# Patient Record
Sex: FEMALE | URBAN - METROPOLITAN AREA
[De-identification: names, ages, dates, MRNs, and addresses within clinical notes are randomized per-mention and may not be internally consistent; named-entity substitution may affect disease eponyms.]

---

## 2017-01-20 ENCOUNTER — OFFICE VISIT (OUTPATIENT)
Dept: FAMILY MEDICINE | Facility: CLINIC | Age: 47
End: 2017-01-20
Payer: COMMERCIAL

## 2017-01-20 VITALS
HEIGHT: 59 IN | WEIGHT: 118 LBS | DIASTOLIC BLOOD PRESSURE: 64 MMHG | OXYGEN SATURATION: 98 % | SYSTOLIC BLOOD PRESSURE: 110 MMHG | HEART RATE: 65 BPM | BODY MASS INDEX: 23.79 KG/M2 | TEMPERATURE: 99.2 F

## 2017-01-20 DIAGNOSIS — R51.9 NONINTRACTABLE EPISODIC HEADACHE, UNSPECIFIED HEADACHE TYPE: Primary | ICD-10-CM

## 2017-01-20 DIAGNOSIS — E87.6 HYPOKALEMIA: ICD-10-CM

## 2017-01-20 DIAGNOSIS — M25.512 ACUTE PAIN OF LEFT SHOULDER: ICD-10-CM

## 2017-01-20 PROCEDURE — 36415 COLL VENOUS BLD VENIPUNCTURE: CPT | Performed by: FAMILY MEDICINE

## 2017-01-20 PROCEDURE — 99214 OFFICE O/P EST MOD 30 MIN: CPT | Performed by: FAMILY MEDICINE

## 2017-01-20 PROCEDURE — 83735 ASSAY OF MAGNESIUM: CPT | Performed by: FAMILY MEDICINE

## 2017-01-20 PROCEDURE — 80048 BASIC METABOLIC PNL TOTAL CA: CPT | Performed by: FAMILY MEDICINE

## 2017-01-20 NOTE — NURSING NOTE
"Chief Complaint   Patient presents with     Headache     Shoulder Pain     left       Initial /64 mmHg  Pulse 65  Temp(Src) 99.2  F (37.3  C) (Tympanic)  Ht 4' 10.5\" (1.486 m)  Wt 118 lb (53.524 kg)  BMI 24.24 kg/m2  SpO2 98% Estimated body mass index is 24.24 kg/(m^2) as calculated from the following:    Height as of this encounter: 4' 10.5\" (1.486 m).    Weight as of this encounter: 118 lb (53.524 kg)..  BP completed using cuff size: regular Sarah Severson, CMA  "

## 2017-01-20 NOTE — Clinical Note
[address] [address]                            [phone]  Fax: [phone]  January 23, 2017     Michelle Toussaint  [address] [place] [address]      Dear Michelle,    I have reviewed your recent labs. Here are the results:      -All of your labs are normal.      Results for orders placed or performed in visit on 01/20/17   Basic metabolic panel   Result Value Ref Range    Sodium 143 133 - 144 mmol/L    Potassium 3.9 3.4 - 5.3 mmol/L    Chloride 107 94 - 109 mmol/L    Carbon Dioxide 26 20 - 32 mmol/L    Anion Gap 10 3 - 14 mmol/L    Glucose 90 70 - 99 mg/dL    Urea Nitrogen 11 7 - 30 mg/dL    Creatinine 0.63 0.52 - 1.04 mg/dL    GFR Estimate >90  Non  GFR Calc   >60 mL/min/1.7m2    GFR Estimate If Black >90   GFR Calc   >60 mL/min/1.7m2    Calcium 8.6 8.5 - 10.1 mg/dL   Magnesium   Result Value Ref Range    Magnesium 2.2 1.6 - 2.3 mg/dL               Thank you for choosing [facility].  We appreciate the opportunity to serve you and look forward to supporting your healthcare needs in the future.    If you have any questions or concerns, please call me or my staff at [phone].      Sincerely,      Sunny Valles M.D.

## 2017-01-20 NOTE — PROGRESS NOTES
SUBJECTIVE:                                                    Michelle Toussaint is a 46 year old female who presents to clinic today for the following health issues:      Headache     Onset: Monday    Description:   Location: bilateral in the frontal area, bilateral in the temporal area, bilateral in the occipital area   Character: throbbing pain, squeezing pain  Frequency:  varies  Duration:  varies    Intensity: moderate, severe    Progression of Symptoms:  worsening    Accompanying Signs & Symptoms:  Stiff neck: no   Neck or upper back pain: no   Fever: YES  Sinus pressure: YES  Nausea or vomiting: YES  Dizziness: YES  Numbness: no   Weakness: no   Visual changes: YES   History:   Head trauma: no   Family history of migraines: no   Previous tests for headaches: no   Neurologist evaluations: no   Able to do daily activities: no   Wake with a headaches: YES  Do headaches wake you up: YES  Daily pain medication use: YES  Work/school stressors/changes: YES    Precipitating factors:   Does light make it worse: YES  Does sound make it worse: YES    Alleviating factors:  Does sleep help: YES         Therapies Tried and outcome: Ibuprofen (Advil, Motrin), Tylenol and narcotics ( codeine ) zofran, warm baths    Patient had a head CT in 2015 which was negative. She has h/o migraines. No URI symptoms reported.     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     H/o hypokalemia. Takes potassium regularly.   HYPERTENSION - well controlled with the  medication.       Problem list and histories reviewed & adjusted, as indicated.  Additional history: as documented    Patient Active Problem List   Diagnosis     Hypertensive urgency     Trichotillomania     HTN, goal below 140/90     Insomnia     Perimenopausal symptoms     Vitamin D deficiency     Fatigue     Abdominal cramps     Past Surgical History   Procedure Laterality Date     Gallbladder surgery       2007?     Cholecystectomy       Esophagoscopy, gastroscopy, duodenoscopy (egd), combined N/A 9/4/2015     Procedure: COMBINED ESOPHAGOSCOPY, GASTROSCOPY, DUODENOSCOPY (EGD), BIOPSY SINGLE OR MULTIPLE;  Surgeon: Hao Grove MD;  Location:  GI     Colonoscopy N/A 10/13/2015     Procedure: COMBINED COLONOSCOPY, SINGLE OR MULTIPLE BIOPSY/POLYPECTOMY BY BIOPSY;  Surgeon: Michael Mccoy MD;  Location:  GI       Social History   Substance Use Topics     Smoking status: Never Smoker      Smokeless tobacco: Never Used     Alcohol Use: 0.0 oz/week     0 Standard drinks or equivalent per week      Comment: occasional      Family History   Problem Relation Age of Onset     DIABETES Mother      Hypertension Sister      CEREBROVASCULAR DISEASE Sister      TIA 35 years, heart defect.      Heart Failure Father      Cirrhosis Father          Current Outpatient Prescriptions   Medication Sig Dispense Refill     polyethylene glycol (MIRALAX/GLYCOLAX) powder MIX 17 GRAMS IN LIQUID AND DRINK BY MOUTH EVERY  g 1     potassium chloride SA (POTASSIUM CHLORIDE) 20 MEQ CR tablet Take 1 tablet (20 mEq) by mouth 2 times daily 60 tablet 3     ondansetron (ZOFRAN) 4 MG tablet Take 1 tablet (4 mg) by mouth every 8 hours as needed for nausea 18 tablet 1     GLUCOSAMINE SULFATE PO Take 500 mg by mouth       lisinopril-hydrochlorothiazide (PRINZIDE,ZESTORETIC) 20-25 MG per tablet Take 1 tablet by mouth daily 90 tablet 3     traZODone (DESYREL) 50 MG tablet TAKE 1 TABLET BY MOUTH EVERY NIGHT AT BEDTIME 30 tablet 11      "omeprazole (PRILOSEC) 40 MG capsule Take 1 capsule (40 mg) by mouth daily 90 capsule 3     meclizine (ANTIVERT) 25 MG tablet Take 1 tablet (25 mg) by mouth every 6 hours as needed for dizziness 30 tablet 1     cyclobenzaprine (FLEXERIL) 10 MG tablet Take 0.5-1 tablets (5-10 mg) by mouth 3 times daily as needed for muscle spasms 30 tablet 1     Allergies   Allergen Reactions     Tylenol [Acetaminophen] Nausea and Itching       ROS:  C: NEGATIVE for fever, chills, change in weight  INTEGUMENTARY/SKIN: NEGATIVE for worrisome rashes, moles or lesions  E/M: NEGATIVE for ear, mouth and throat problems  R: NEGATIVE for significant cough or SOB  CV: NEGATIVE for chest pain, palpitations or peripheral edema    OBJECTIVE:                                                    /64 mmHg  Pulse 65  Temp(Src) 99.2  F (37.3  C) (Tympanic)  Ht 4' 10.5\" (1.486 m)  Wt 118 lb (53.524 kg)  BMI 24.24 kg/m2  SpO2 98%  Body mass index is 24.24 kg/(m^2).   GENERAL: healthy, alert, well nourished, well hydrated, no distress  EYES: Eyes grossly normal to inspection, extraocular movements - intact, and PERRL  HENT: ear canals- normal; TMs- normal; Nose- normal; Mouth- no ulcers, no lesions  NECK: no tenderness, no adenopathy, no asymmetry, no masses, no stiffness; thyroid- normal to palpation  RESP: lungs clear to auscultation - no rales, no rhonchi, no wheezes  CV: regular rates and rhythm, normal S1 S2, no S3 or S4 and no murmur, no click or rub -  NEURO: strength and tone- normal, sensory exam- grossly normal, mentation- intact, speech- normal, reflexes- symmetric  Left shoulder: ROM is limited due to pain. Tender over the lateral part of deltoid. No swelling, ecchymosis noted.           ASSESSMENT/PLAN:                                                      1. Nonintractable episodic headache, unspecified headache type  Questionable etiology. Patient requested a stronger pain medication.   Recommended against narcotics.  Recommended " to stay well hydrated.   May try OTC Excedrin  Holding off on CT head as she had one in 2015 which was normal.   NO sings of URI.  Recommended to see neurology . Patient has h/o migraine.    - NEUROLOGY ADULT REFERRAL    2. Acute pain of left shoulder  Likely muscular sprain. Recommended to avoid lifting >10 lbs at work for the next 2 weeks.use ibuprofen and ice the area. If no improvement in 2 weeks noted, she should get MRI of the shoulder.     3. Hypokalemia    Await the BMP results.   - Basic metabolic panel  - Magnesium      Follow up with Provider - as needed     Reena Correa MD  [facility]

## 2017-01-20 NOTE — MR AVS SNAPSHOT
After Visit Summary   1/20/2017    Michelle Toussaint    MRN: [id number]           Patient Information     Date Of Birth          1970        Visit Information        Provider Department      1/20/2017 1:20 PM Reena Correa MD [facility] [place]        Today's Diagnoses     Hypokalemia    -  1     Nonintractable episodic headache, unspecified headache type         Acute pain of left shoulder            Follow-ups after your visit        Additional Services     NEUROLOGY ADULT REFERRAL       Your provider has referred you to: N: [facility]  [place] [phone]   [email or web address]    Reason for Referral: Consult    Please be aware that coverage of these services is subject to the terms and limitations of your health insurance plan.  Call member services at your health plan with any benefit or coverage questions.      Please bring the following with you to your appointment:    (1) Any X-Rays, CTs or MRIs which have been performed.  Contact the facility where they were done to arrange for  prior to your scheduled appointment.    (2) List of current medications  (3) This referral request   (4) Any documents/labs given to you for this referral                  Who to contact     If you have questions or need follow up information about today's clinic visit or your schedule please contact [facility] [place] directly at [phone].  Normal or non-critical lab and imaging results will be communicated to you by MyChart, letter or phone within 4 business days after the clinic has received the results. If you do not hear from us within 7 days, please contact the clinic through MyChart or phone. If you have a critical or abnormal lab result, we will notify you by phone as soon as possible.  Submit refill requests through [employer] or call your pharmacy and they will forward the refill request to us. Please allow 3 business  "days for your refill to be completed.          Additional Information About Your Visit        [employer] Information     [employer] lets you send messages to your doctor, view your test results, renew your prescriptions, schedule appointments and more. To sign up, go to [email or web address] . Click on \"Log in\" on the left side of the screen, which will take you to the Welcome page. Then click on \"Sign up Now\" on the right side of the page.     You will be asked to enter the access code listed below, as well as some personal information. Please follow the directions to create your username and password.     Your access code is: [email or web address]  Expires: 2017  1:59 PM     Your access code will  in 90 days. If you need help or a new code, please call your [facility] clinic or [phone].        Care EveryWhere ID     This is your Care EveryWhere ID. This could be used by other organizations to access your [facility] medical records  [id number]        Your Vitals Were     Pulse Temperature Height BMI (Body Mass Index) Pulse Oximetry       65 99.2  F (37.3  C) (Tympanic) 4' 10.5\" (1.486 m) 24.24 kg/m2 98%        Blood Pressure from Last 3 Encounters:   17 110/64   16 120/80   16 132/84    Weight from Last 3 Encounters:   17 118 lb (53.524 kg)   16 115 lb (52.164 kg)   16 120 lb (54.432 kg)              We Performed the Following     Basic metabolic panel     NEUROLOGY ADULT REFERRAL          Today's Medication Changes          These changes are accurate as of: 17  2:00 PM.  If you have any questions, ask your nurse or doctor.               Stop taking these medicines if you haven't already. Please contact your care team if you have questions.     albuterol 108 (90 BASE) MCG/ACT Inhaler   Commonly known as:  PROAIR HFA/PROVENTIL HFA/VENTOLIN HFA   Stopped by:  Reena Correa MD           order for DME   Stopped by:  Reena Correa MD                    Primary Care Provider " Office Phone # Fax #    Reena Correa [phone]       [facility] [place] [address] [place] [address] [address]  [place] [address]        Thank you!     [facility]  [facility]. Our goal is always to provide you with excellent care. Hearing back from our patients is one way we can continue to improve our services. Please take a few minutes to complete the written survey that you may receive in the mail after your visit with us. Thank you!             Your Updated Medication List - Protect others around you: Learn how to safely use, store and throw away your medicines at www.disposemymeds.org.          This list is accurate as of: 1/20/17  2:00 PM.  Always use your most recent med list.                   Brand Name Dispense Instructions for use    cyclobenzaprine 10 MG tablet    FLEXERIL    30 tablet    Take 0.5-1 tablets (5-10 mg) by mouth 3 times daily as needed for muscle spasms       GLUCOSAMINE SULFATE PO      Take 500 mg by mouth       lisinopril-hydrochlorothiazide 20-25 MG per tablet    PRINZIDE/ZESTORETIC    90 tablet    Take 1 tablet by mouth daily       meclizine 25 MG tablet    ANTIVERT    30 tablet    Take 1 tablet (25 mg) by mouth every 6 hours as needed for dizziness       omeprazole 40 MG capsule    priLOSEC    90 capsule    Take 1 capsule (40 mg) by mouth daily       ondansetron 4 MG tablet    ZOFRAN    18 tablet    Take 1 tablet (4 mg) by mouth every 8 hours as needed for nausea       polyethylene glycol powder    MIRALAX/GLYCOLAX    527 g    MIX 17 GRAMS IN LIQUID AND DRINK BY MOUTH EVERY DAY       potassium chloride SA 20 MEQ CR tablet    potassium chloride    60 tablet    Take 1 tablet (20 mEq) by mouth 2 times daily       traZODone 50 MG tablet    DESYREL    30 tablet    TAKE 1 TABLET BY MOUTH EVERY NIGHT AT BEDTIME

## 2017-01-20 NOTE — Clinical Note
[facility]          [address] [place] [address]                            [phone]  Fax: [phone]    Michelle Toussaint  [facility] [address] [place] [address]    [phone]    January 20, 2017      To whom it may concern     Michelle Toussaint was seen today at the clinic. I have recommended to avoid lifting, pulling pushing weight more than 10 lbs for the next 2 weeks. After that she can resume normal work.      If you have any other questions or concerns please feel free to contact me at anytime.        Sincerely,      Sunny Valles M.D.

## 2017-01-21 LAB
ANION GAP SERPL CALCULATED.3IONS-SCNC: 10 MMOL/L (ref 3–14)
BUN SERPL-MCNC: 11 MG/DL (ref 7–30)
CALCIUM SERPL-MCNC: 8.6 MG/DL (ref 8.5–10.1)
CHLORIDE SERPL-SCNC: 107 MMOL/L (ref 94–109)
CO2 SERPL-SCNC: 26 MMOL/L (ref 20–32)
CREAT SERPL-MCNC: 0.63 MG/DL (ref 0.52–1.04)
GFR SERPL CREATININE-BSD FRML MDRD: NORMAL ML/MIN/1.7M2
GLUCOSE SERPL-MCNC: 90 MG/DL (ref 70–99)
MAGNESIUM SERPL-MCNC: 2.2 MG/DL (ref 1.6–2.3)
POTASSIUM SERPL-SCNC: 3.9 MMOL/L (ref 3.4–5.3)
SODIUM SERPL-SCNC: 143 MMOL/L (ref 133–144)

## 2017-01-26 ENCOUNTER — TELEPHONE (OUTPATIENT)
Dept: FAMILY MEDICINE | Facility: CLINIC | Age: 47
End: 2017-01-26

## 2017-01-26 DIAGNOSIS — H93.19 RINGING IN EAR, UNSPECIFIED LATERALITY: Primary | ICD-10-CM

## 2017-01-26 NOTE — TELEPHONE ENCOUNTER
1- She should continue potassium supplements as before.   2- She had her hemoglobin check 1 month ago and it was normal, therefore it was not repeated.  3- For the ear symptoms, I would like her to see ENT. Referral ordered.   4- No need for OV. I will fill the form.    Reena Correa MD  [facility] [place]

## 2017-01-26 NOTE — TELEPHONE ENCOUNTER
Patient calling for test results - went over the normal values.  1. Patient wants to specificly know if she should continue with the potassium suppliment.    2. Also states that she continuously feels worn out and tired and was wondering if the labs would show this?  Questioned a cbc- this was not drawn    3. States that she has been having a ringing in her ears- but only when she goes upstairs-sometimes it happens on the main floor. But get's louder when she goes upstairs- like a base drum sound  Has meclizine and wants to know if this is something she can take for the ears?    4. Is on work restrictions for her left arm/shoulder and needs a form filled out to lift the restrictions. Shoulder is still a little uncomfortable, but return to work date is 02/03/17 and she thinks she will be fine by then.  Does not have the form yet, but wants to know if she needs an appointment for this?    please advise,    Regina Mccord RN  [facility]  [phone]

## 2017-01-26 NOTE — TELEPHONE ENCOUNTER
Left detailed message (we have consent) informing of below and providing referral information.   Agueda Quispe RN   [facility] - Triage

## 2017-02-02 ENCOUNTER — TELEPHONE (OUTPATIENT)
Dept: FAMILY MEDICINE | Facility: CLINIC | Age: 47
End: 2017-02-02

## 2017-02-02 NOTE — TELEPHONE ENCOUNTER
Forms signed by Reena Correa MD   and they were faxed to [facility] [phone] on February 2, 2017  Original Mailed to patient  Copy Sent to abstracting for scanning.  Gregoria Gonzalez TC

## 2017-06-27 DIAGNOSIS — K21.9 GASTROESOPHAGEAL REFLUX DISEASE WITHOUT ESOPHAGITIS: ICD-10-CM

## 2017-06-27 DIAGNOSIS — H81.10 BPPV (BENIGN PAROXYSMAL POSITIONAL VERTIGO), UNSPECIFIED LATERALITY: ICD-10-CM

## 2017-06-28 DIAGNOSIS — E87.6 HYPOKALEMIA: ICD-10-CM

## 2017-06-28 RX ORDER — POTASSIUM CHLORIDE 1500 MG/1
20 TABLET, EXTENDED RELEASE ORAL 2 TIMES DAILY
Qty: 60 TABLET | Refills: 1 | Status: SHIPPED | OUTPATIENT
Start: 2017-06-28 | End: 2017-07-19

## 2017-06-28 NOTE — TELEPHONE ENCOUNTER
Last OV note on 12/19/16 as below    Hypokalemia  Starting the patient on potassium supplement.   - potassium chloride SA (POTASSIUM CHLORIDE) 20 MEQ CR tablet; Take 1 tablet (20 mEq) by mouth 2 times daily  Dispense: 60 tablet; Refill: 3    Please review if patient still need to be on Potassium supplement and refill as appropriate.  Thank you    Cierra Lam RN

## 2017-06-28 NOTE — LETTER
[address] [place] [address]   [phone]      July 6, 2017    Michelle Toussaint  [address] [place] [address]            Dear Ms. Terry Toussaint    Your physician requires lab work in order to monitor your maintenance medication(s).  Please call the clinic at [phone] to schedule a lab only appointment. You do not need to be fasting.        Sincerely,    [facility]

## 2017-06-28 NOTE — TELEPHONE ENCOUNTER
Patient needs BMP checked. Medication ordered. BMP ordered.    Reena Correa MD  [facility], [place]

## 2017-06-28 NOTE — TELEPHONE ENCOUNTER
Omeprazole      Last Written Prescription Date: 6/24/16  Last Fill Quantity: 90,  # refills: 3   Last Office Visit with [facility], [facility] or [facility] prescribing provider: 1/20/17    Meclizine      Last Written Prescription Date: 6/24/16  Last Fill Quantity: 30,  # refills: 1   Last Office Visit with [facility], [facility] or [facility] prescribing provider: 1/20/17

## 2017-06-28 NOTE — TELEPHONE ENCOUNTER
potassium chloride SA (POTASSIUM CHLORIDE) 20 MEQ      Last Written Prescription Date: 12/20/16  Last Fill Quantity: 60, # refills: 3  Last Office Visit with [facility], P or [facility] prescribing provider: 1/20/17       Potassium   Date Value Ref Range Status   01/20/2017 3.9 3.4 - 5.3 mmol/L Final     Creatinine   Date Value Ref Range Status   01/20/2017 0.63 0.52 - 1.04 mg/dL Final     BP Readings from Last 3 Encounters:   01/20/17 110/64   12/19/16 120/80   09/07/16 132/84

## 2017-06-28 NOTE — TELEPHONE ENCOUNTER
Routing to team to inform and assist in scheduling.   Agueda Quispe RN   [facility] - Triage

## 2017-06-30 RX ORDER — MECLIZINE HYDROCHLORIDE 25 MG/1
TABLET ORAL
Qty: 30 TABLET | Refills: 0 | Status: SHIPPED | OUTPATIENT
Start: 2017-06-30 | End: 2017-07-26

## 2017-06-30 RX ORDER — OMEPRAZOLE 40 MG/1
CAPSULE, DELAYED RELEASE ORAL
Qty: 90 CAPSULE | Refills: 0 | Status: SHIPPED | OUTPATIENT
Start: 2017-06-30 | End: 2017-09-01

## 2017-06-30 NOTE — TELEPHONE ENCOUNTER
left voicemail message for patient to contact Main Clinic Number to schedule.  NTBS: Patient needs BMP checked- Just needs a non fasting lab appt  Gregoria ZAPATA

## 2017-06-30 NOTE — TELEPHONE ENCOUNTER
Prescription approved per [facility] Refill Protocol.  Jony Pulido, RN, BSN

## 2017-07-13 DIAGNOSIS — E87.6 HYPOKALEMIA: ICD-10-CM

## 2017-07-13 PROCEDURE — 36415 COLL VENOUS BLD VENIPUNCTURE: CPT | Performed by: FAMILY MEDICINE

## 2017-07-13 PROCEDURE — 80048 BASIC METABOLIC PNL TOTAL CA: CPT | Performed by: FAMILY MEDICINE

## 2017-07-14 LAB
ANION GAP SERPL CALCULATED.3IONS-SCNC: 11 MMOL/L (ref 3–14)
BUN SERPL-MCNC: 18 MG/DL (ref 7–30)
CALCIUM SERPL-MCNC: 9.3 MG/DL (ref 8.5–10.1)
CHLORIDE SERPL-SCNC: 104 MMOL/L (ref 94–109)
CO2 SERPL-SCNC: 24 MMOL/L (ref 20–32)
CREAT SERPL-MCNC: 0.6 MG/DL (ref 0.52–1.04)
GFR SERPL CREATININE-BSD FRML MDRD: NORMAL ML/MIN/1.7M2
GLUCOSE SERPL-MCNC: 89 MG/DL (ref 70–99)
POTASSIUM SERPL-SCNC: 3.9 MMOL/L (ref 3.4–5.3)
SODIUM SERPL-SCNC: 139 MMOL/L (ref 133–144)

## 2017-07-19 ENCOUNTER — TELEPHONE (OUTPATIENT)
Dept: FAMILY MEDICINE | Facility: CLINIC | Age: 47
End: 2017-07-19

## 2017-07-19 DIAGNOSIS — G47.00 INSOMNIA, UNSPECIFIED TYPE: Primary | ICD-10-CM

## 2017-07-19 DIAGNOSIS — E87.6 HYPOKALEMIA: ICD-10-CM

## 2017-07-19 RX ORDER — POTASSIUM CHLORIDE 1500 MG/1
20 TABLET, EXTENDED RELEASE ORAL DAILY
Start: 2017-07-19 | End: 2017-09-05

## 2017-07-19 NOTE — TELEPHONE ENCOUNTER
Reason for Call:  Other call back    Detailed comments: PT STATED POTASSIUM LEVELS GOOD LAST LAB DRAW, WANTS TO KNOW IF SHE SHOULD STOP POTASSIUM PILLS DUE TO NORMAL LABS    Phone Number Patient can be reached at: Home number on file [phone] (home)    Best Time: NA    Can we leave a detailed message on this number? YES    Call taken on 7/19/2017 at 1:47 PM by Barry Martin

## 2017-07-19 NOTE — TELEPHONE ENCOUNTER
Patient notified with information noted below from provider and agrees with plan.  Kiki Rae RN - Triage  [facility]

## 2017-07-19 NOTE — TELEPHONE ENCOUNTER
I would suggest trying to lowering it to 20 mEq QD, instead of BID.   Recheck in 2-3 months again, and then we can decide, if we can stop or not.     Reena Correa MD  [facility] [place]

## 2017-07-19 NOTE — TELEPHONE ENCOUNTER
So she has been taking 20 mEq daily. I would suggest using half tab only, 10 mEq daily. Recheck in 2-3 months.    Reena Correa MD  [facility], [place]

## 2017-07-19 NOTE — TELEPHONE ENCOUNTER
Patient reports that for the last month she has been taking only 1 tablet due to them being so big.  Will continue to take one tablet and follow up.  Kiki Rae RN - Triage  [facility]

## 2017-07-20 RX ORDER — TRAZODONE HYDROCHLORIDE 50 MG/1
TABLET, FILM COATED ORAL
Qty: 30 TABLET | Refills: 3 | Status: SHIPPED | OUTPATIENT
Start: 2017-07-20

## 2017-07-26 ENCOUNTER — OFFICE VISIT (OUTPATIENT)
Dept: FAMILY MEDICINE | Facility: CLINIC | Age: 47
End: 2017-07-26
Payer: COMMERCIAL

## 2017-07-26 VITALS
HEIGHT: 59 IN | SYSTOLIC BLOOD PRESSURE: 136 MMHG | WEIGHT: 122 LBS | TEMPERATURE: 99 F | DIASTOLIC BLOOD PRESSURE: 68 MMHG | OXYGEN SATURATION: 100 % | BODY MASS INDEX: 24.6 KG/M2 | HEART RATE: 66 BPM

## 2017-07-26 DIAGNOSIS — G47.00 INSOMNIA, UNSPECIFIED TYPE: ICD-10-CM

## 2017-07-26 DIAGNOSIS — E55.9 VITAMIN D DEFICIENCY: ICD-10-CM

## 2017-07-26 DIAGNOSIS — M54.2 NECK PAIN: ICD-10-CM

## 2017-07-26 DIAGNOSIS — R42 DIZZINESS: Primary | ICD-10-CM

## 2017-07-26 DIAGNOSIS — F41.9 ANXIETY AND DEPRESSION: ICD-10-CM

## 2017-07-26 DIAGNOSIS — F32.A ANXIETY AND DEPRESSION: ICD-10-CM

## 2017-07-26 DIAGNOSIS — M26.609 TMJ (TEMPOROMANDIBULAR JOINT SYNDROME): ICD-10-CM

## 2017-07-26 DIAGNOSIS — I10 ESSENTIAL HYPERTENSION WITH GOAL BLOOD PRESSURE LESS THAN 140/90: ICD-10-CM

## 2017-07-26 DIAGNOSIS — H81.10 BPPV (BENIGN PAROXYSMAL POSITIONAL VERTIGO), UNSPECIFIED LATERALITY: ICD-10-CM

## 2017-07-26 PROCEDURE — 82306 VITAMIN D 25 HYDROXY: CPT | Performed by: FAMILY MEDICINE

## 2017-07-26 PROCEDURE — 36415 COLL VENOUS BLD VENIPUNCTURE: CPT | Performed by: FAMILY MEDICINE

## 2017-07-26 PROCEDURE — 80053 COMPREHEN METABOLIC PANEL: CPT | Performed by: FAMILY MEDICINE

## 2017-07-26 PROCEDURE — 99215 OFFICE O/P EST HI 40 MIN: CPT | Performed by: FAMILY MEDICINE

## 2017-07-26 RX ORDER — SERTRALINE HYDROCHLORIDE 25 MG/1
25 TABLET, FILM COATED ORAL DAILY
Qty: 30 TABLET | Refills: 1 | Status: SHIPPED | OUTPATIENT
Start: 2017-07-26 | End: 2017-11-30 | Stop reason: DRUGHIGH

## 2017-07-26 RX ORDER — ALPRAZOLAM 0.25 MG
0.25 TABLET ORAL 3 TIMES DAILY PRN
Qty: 14 TABLET | Refills: 0 | Status: SHIPPED | OUTPATIENT
Start: 2017-07-26 | End: 2017-08-23

## 2017-07-26 RX ORDER — MECLIZINE HYDROCHLORIDE 25 MG/1
25 TABLET ORAL 3 TIMES DAILY PRN
Qty: 30 TABLET | Refills: 0 | Status: SHIPPED | OUTPATIENT
Start: 2017-07-26

## 2017-07-26 RX ORDER — BIOTIN 5 MG
TABLET ORAL
COMMUNITY

## 2017-07-26 ASSESSMENT — ANXIETY QUESTIONNAIRES
7. FEELING AFRAID AS IF SOMETHING AWFUL MIGHT HAPPEN: MORE THAN HALF THE DAYS
GAD7 TOTAL SCORE: 15
5. BEING SO RESTLESS THAT IT IS HARD TO SIT STILL: MORE THAN HALF THE DAYS
3. WORRYING TOO MUCH ABOUT DIFFERENT THINGS: NEARLY EVERY DAY
IF YOU CHECKED OFF ANY PROBLEMS ON THIS QUESTIONNAIRE, HOW DIFFICULT HAVE THESE PROBLEMS MADE IT FOR YOU TO DO YOUR WORK, TAKE CARE OF THINGS AT HOME, OR GET ALONG WITH OTHER PEOPLE: SOMEWHAT DIFFICULT
2. NOT BEING ABLE TO STOP OR CONTROL WORRYING: MORE THAN HALF THE DAYS
1. FEELING NERVOUS, ANXIOUS, OR ON EDGE: SEVERAL DAYS
6. BECOMING EASILY ANNOYED OR IRRITABLE: MORE THAN HALF THE DAYS

## 2017-07-26 ASSESSMENT — PATIENT HEALTH QUESTIONNAIRE - PHQ9: 5. POOR APPETITE OR OVEREATING: NEARLY EVERY DAY

## 2017-07-26 NOTE — NURSING NOTE
"Chief Complaint   Patient presents with     Hypertension     elevated bp        Initial /82  Pulse 66  Temp 99  F (37.2  C) (Tympanic)  Ht 4' 10.5\" (1.486 m)  Wt 122 lb (55.3 kg)  LMP 07/22/2017  SpO2 100%  BMI 25.06 kg/m2 Estimated body mass index is 25.06 kg/(m^2) as calculated from the following:    Height as of this encounter: 4' 10.5\" (1.486 m).    Weight as of this encounter: 122 lb (55.3 kg).  Medication Reconciliation: complete  "

## 2017-07-26 NOTE — PROGRESS NOTES
SUBJECTIVE:                                                    Michelle Toussaint is a 47 year old female who presents to clinic today for the following health issues:      Hypertension Follow-up      Outpatient blood pressures are being checked at work.  Results are running higher lately 130-140/ 80-95      Very elevated 172/102 at work today and she was not feeling too well     Low Salt Diet: low salt     Has been experiencing headaches , dizziness, nausea and fatigue. decided to check BP and it was elevated at work also elevated  On and off for the past couple of days.      Has hx of vertigo and has bene given meclizine for vertigo , so sometimes when has vertigo her BP can get higher too .    Has been feeling achy and week feeling , more tired recently. Not sleeping too well lately. not taking her trazodone bc it makes her tired next day.  melatonin does not wok has been  more stressed these day     Her neck always bothers her and get achy stiff, and having more HA lately sometimes her arm feel achy and sore no numbness or focal weakness. Feeling stressed at work and she wakes wit HA lately .   Denies any chest pain sob, exertional sob palpitation . sometimes she notice chest tightness at night when she is trying to relax at night and could be anxiety     Amount of exercise or physical activity: None    Problems taking medications regularly: No    Medication side effects: none  Diet: regular (no restrictions)      PROBLEMS TO ADD ON...  Had low vitamin d . Wish to get recheck       Hypertension Follow-up      Outpatient blood pressures are being checked at home and work.  Results are as above .    Low Salt Diet: no added salt    Depression and Anxiety Follow-Up    Status since last visit: Worsened     Other associated symptoms:See phq-9 and darinel 7    Complicating factors:     Significant life event: No     Current substance abuse: None    PHQ-9 SCORE 7/26/2017   Total Score 16     DARINEL-7 SCORE 7/26/2017    Total Score 15       PHQ-9  English  PHQ-9   Any Language  GAD7          Problem list and histories reviewed & adjusted, as indicated.  Additional history: as documented    Patient Active Problem List   Diagnosis     Hypertensive urgency     Trichotillomania     HTN, goal below 140/90     Insomnia     Perimenopausal symptoms     Vitamin D deficiency     Fatigue     Abdominal cramps     Past Surgical History:   Procedure Laterality Date     CHOLECYSTECTOMY       COLONOSCOPY N/A 10/13/2015    Procedure: COMBINED COLONOSCOPY, SINGLE OR MULTIPLE BIOPSY/POLYPECTOMY BY BIOPSY;  Surgeon: Michael Mccoy MD;  Location: [facility]     ESOPHAGOSCOPY, GASTROSCOPY, DUODENOSCOPY (EGD), COMBINED N/A 9/4/2015    Procedure: COMBINED ESOPHAGOSCOPY, GASTROSCOPY, DUODENOSCOPY (EGD), BIOPSY SINGLE OR MULTIPLE;  Surgeon: Hao Grove MD;  Location: [facility]     GALLBLADDER SURGERY      2007?       Social History   Substance Use Topics     Smoking status: Never Smoker     Smokeless tobacco: Never Used     Alcohol use 0.0 oz/week     0 Standard drinks or equivalent per week      Comment: occasional      Family History   Problem Relation Age of Onset     DIABETES Mother      Hypertension Sister      CEREBROVASCULAR DISEASE Sister      TIA 35 years, heart defect.      Heart Failure Father      Cirrhosis Father              Reviewed and updated as needed this visit by clinical staff     Reviewed and updated as needed this visit by Provider         ROS:  C: NEGATIVE for fever, chills, change in weight  I: NEGATIVE for worrisome rashes, moles or lesions  E: NEGATIVE for vision changes or irritation  E/M: NEGATIVE for ear, mouth and throat problems  R: NEGATIVE for significant cough or SOB  CV: NEGATIVE for chest pain, palpitations or peripheral edema  GI: NEGATIVE for nausea, abdominal pain, heartburn, or change in bowel habits  : NEGATIVE for frequency, dysuria, or hematuria  MUSCULOSKELETAL:NEGATIVE for significant arthralgias or  "myalgia, POSITIVE  for ongoing pain neck pain  NEURO: as per HPI   E: NEGATIVE for temperature intolerance, skin/hair changes  H: NEGATIVE for bleeding problems  PSYCHIATRIC: as per HPI      OBJECTIVE:     /82  Pulse 66  Temp 99  F (37.2  C) (Tympanic)  Ht 4' 10.5\" (1.486 m)  Wt 122 lb (55.3 kg)  LMP 07/22/2017  SpO2 100%  BMI 25.06 kg/m2  Body mass index is 25.06 kg/(m^2).  GENERAL: healthy, alert and no distress  EYES: Eyes grossly normal to inspection, PERRL and conjunctivae and sclerae normal  HENT: ear canals and TM's normal, nose and mouth without ulcers or lesions  NECK: no adenopathy, no asymmetry, masses, or scars and thyroid normal to palpation  RESP: lungs clear to auscultation - no rales, rhonchi or wheezes  CV: regular rate and rhythm, normal S1 S2, no S3 or S4, no murmur, click or rub, no peripheral edema and peripheral pulses strong  ABDOMEN: soft, nontender, no hepatosplenomegaly, no masses and bowel sounds normal  MS: decreased range of motion  and tenderness to palpation   NEURO: Normal strength and tone, sensory exam grossly normal, mentation intact, DTR's normal and symmetric , gait normal including heel/toe/tandem walking, Romberg normal and rapid alternating movements normal  PSYCH: mentation appears normal, affect normal, anxious, fatigued, speech pressured, judgement and insight intact and appearance well groomed        ASSESSMENT/PLAN:   (R42) Dizziness  (primary encounter diagnosis)  Comment:   Plan: Comprehensive metabolic panel            (H81.10) BPPV (benign paroxysmal positional vertigo), unspecified laterality  Comment:   Plan: meclizine (ANTIVERT) 25 MG tablet        sounds like her vertigo. clinically she is doing ok . Cares and symptomatic treatment discussed follow up if problem . Talked about warning S/S for which should be seen urgently      (I10) Essential hypertension with goal blood pressure less than 140/90  Comment: need to monitor, stress could be causing BP " to go up   Plan: Comprehensive metabolic panel            (M54.2) Neck pain  Comment: left more then rt   Plan: [facility] PT, HAND, AND CHIROPRACTIC REFERRAL            (E55.9) Vitamin D deficiency  Comment:   Plan: Vitamin D Deficiency            (G47.00) Insomnia, unspecified type  Comment:   Plan: sertraline (ZOLOFT) 25 MG tablet            (F41.9,  F32.9) Anxiety and depression  Comment:   Plan: sertraline (ZOLOFT) 25 MG tablet, ALPRAZolam         (XANAX) 0.25 MG tablet            (M26.609) TMJ (temporomandibular joint syndrome)  Comment:   Plan:                Discussed cares, talked about signs and symptoms of anxiety/ depression and treatment options. Willing to try  Zoloft  mg. Pros/ cons of med's discussed . encouraged to see  to help and referral given . spent sometimes counseling patient. Follow up in 2-3 weeks , sooner if problem.     Patient expressed understanding and agreement with treatment plan. All patient's questions were answered, will let me know if has more later.  Medications: Rx's: Reviewed the potential side effects/complications of medications prescribed.   Spent 40+ min with patient and more then 50% of the time spent counseling and coordination of cares       Maria Victoria Lau MD  [facility] [place]

## 2017-07-26 NOTE — MR AVS SNAPSHOT
After Visit Summary   7/26/2017    Michelle Toussaint    MRN: [id number]           Patient Information     Date Of Birth          1970        Visit Information        Provider Department      7/26/2017 11:40 AM Maria Victoria Lau MD [facility]        Today's Diagnoses     Dizziness    -  1    BPPV (benign paroxysmal positional vertigo), unspecified laterality        Essential hypertension with goal blood pressure less than 140/90        Neck pain        Vitamin D deficiency        Insomnia, unspecified type        Anxiety and depression        TMJ (temporomandibular joint syndrome)          Care Instructions      Your Body s Response to Anxiety    Normal anxiety is part of the body s natural defense system. It's an alert to a threat that is unknown, vague, or comes from your own internal fears. While you re in this state, your feelings can range from a vague sense of worry to physical sensations such as a pounding heartbeat. These feelings make you want to react to the threat. An anxiety response is normal in many situations. But when you have an anxiety disorder, the same response can occur at the wrong times.  Anxiety can be helpful  Normal anxiety is a signal from your brain that warns you of a threat and is a normal response to help you prevent something or decrease the bad effects of something you can't control. For example, anxiety is a normal response to situations that might damage your body, separate you from a loved one, or lose your job. The symptoms of anxiety can be physical and mental.  How does it feel?  At certain times, people with anxiety may have:    Dizziness    Muscle tension or pain    Restlessness    Sleeplessness    Trouble concentrating    Racing heartbeat    Fast breathing    Shaking or trembling    Stomachache    Diarrhea    Loss of energy    Sweating    Cold, clammy hands    Chest pain    Dry mouth  Anxiety can also be a problem  Anxiety can  "become a problem when it is hard to control, occurs for months, and interferes with important parts of your life. With an anxiety disorder, your body has the response described above, but in inappropriate ways. The response a person has depends on the anxiety disorder he or she has. With some disorders, the anxiety is way out of proportion to the threat that triggers it. With others, anxiety may occur even when there isn t a clear threat or trigger.  Who does it affect?  Some people are more prone to persistent anxiety than others. It tends to run in families, and it affects more younger people than older people, and more women than men. But no age, race, or gender is immune to anxiety problems.  Anxiety can be treated  The good news is that the anxiety that s disrupting your life can be treated. Check with your healthcare provider and rule out any physical problems that may be causing the anxiety symptoms. If an anxiety disorder is diagnosed seek mental healthcare. This is an illness and it can respond to treatment. Most types of anxiety disorders will respond to \"talk therapy\" and medicines. Working with your doctor or other healthcare provider, you can develop skills to help you cope with anxiety. You can also gain the perspective you need to overcome your fears. Note: Good sources of support or guidance can be found at your local hospital, mental health clinic, or an employee assistance program.  How to cope with anxiety  If anxiety is wearing you down, here are some things you can do to cope:    Keep in mind that you can t control everything about a situation. Change what you can and let the rest take its course.    Exercise--it s a great way to relieve tension and help your body feel relaxed.    Avoid caffeine and nicotine, which can make anxiety symptoms worse.    Fight the temptation to turn to alcohol or unprescribed drugs for relief. They only make things worse in the long run.    Educate yourself about " "anxiety disorders. Keep track of helpful online resources and books you can use during stressful periods.    Try stress management techniques such as meditation.    Consider online or in-person support groups.   Date Last Reviewed: 1/1/2017 2000-2017 [employer]. [address] [place] [address], [place] [address]. All rights reserved. This information is not intended as a substitute for professional medical care. Always follow your healthcare professional's instructions.        Stress Relief: Relaxation  Focusing the mind helps provide stress relief. Taking 5 to 10 minutes to practice relaxation each day helps you feel more refreshed. The following exercises can be done almost anywhere. Try one or more until you find what works best for you.  Calm your mind    Find a quiet place where you won't be disturbed. Then try the following:    Sit comfortably. Take off your shoes. Turn off your cell phone and pager. Take a few deep breaths.    Focus your mind on one peaceful thought, image, or word. Then try to hold that thought for 5 minutes.    When other thoughts enter your mind, relax and refocus. Let the invading thoughts fall away.    When you're done, stand up slowly and stretch your arms over your head. With practice, this exercise can help you feel restored.     Calm your body  With practice, you can use mental cues to tell your body how to feel.    Sit comfortably and clear your mind. A few deep breaths will help.    Mentally focus on your left hand and repeat to yourself, \"My left hand feels warm and heavy.\" Keep doing this until your hand does feel heavier and warmer.    Repeat the exercise using your right hand. Then focus on your arms, legs, and feet until your whole body feels relaxed.    When you're done, stand up slowly and stretch your arms overhead.     Visualization  Visualization is like taking a mental vacation. It frees your mind while keeping your body in a calm state. To get started, picture " yourself feeling warm and relaxed. Choose a peaceful setting that appeals to you and fill in the details. If you imagine a tropical beach, listen to the waves on the shore. Feel the sun on your face. Dig your toes in the sand. By using the power of your mind, you can take a soothing break when you need to.  Date Last Reviewed: 1/1/2017 2000-2017 [employer]. [address] [place] [address], [place] [address]. All rights reserved. This information is not intended as a substitute for professional medical care. Always follow your healthcare professional's instructions.                Follow-ups after your visit        Additional Services     [facility] PT, HAND, AND CHIROPRACTIC REFERRAL       **This order will print in the [facility] Scheduling Office**    Physical Therapy, Hand Therapy and Chiropractic Care are available through:    *[facility]  *[facility]  *[facility]    Call one number to schedule at any of the above locations: [phone].    Your provider has referred you to: Physical Therapy at [facility] or [facility] [place]    Indication/Reason for Referral: Neck Pain, causing frequent HA   Onset of Illness: ongoing for few months on and off   Therapy Orders: Evaluate and Treat  Special Programs:   Special Request:     Ludwin Mascorro      Additional Comments for the Therapist or Chiropractor:     Please be aware that coverage of these services is subject to the terms and limitations of your health insurance plan.  Call member services at your health plan with any benefit or coverage questions.      Please bring the following to your appointment:    *Your personal calendar for scheduling future appointments  *Comfortable clothing                  Who to contact     If you have questions or need follow up information about today's clinic visit or your schedule please contact [facility] [place] directly at [phone].  Normal or non-critical lab and imaging results  "will be communicated to you by MyChart, letter or phone within 4 business days after the clinic has received the results. If you do not hear from us within 7 days, please contact the clinic through [employer] or phone. If you have a critical or abnormal lab result, we will notify you by phone as soon as possible.  Submit refill requests through [employer] or call your pharmacy and they will forward the refill request to us. Please allow 3 business days for your refill to be completed.          Additional Information About Your Visit        [employer] Information     [employer] lets you send messages to your doctor, view your test results, renew your prescriptions, schedule appointments and more. To sign up, go to [email or web address] . Click on \"Log in\" on the left side of the screen, which will take you to the Welcome page. Then click on \"Sign up Now\" on the right side of the page.     You will be asked to enter the access code listed below, as well as some personal information. Please follow the directions to create your username and password.     Your access code is: [email or web address]  Expires: 10/24/2017 12:09 PM     Your access code will  in 90 days. If you need help or a new code, please call your [place] clinic or [phone].        Care EveryWhere ID     This is your Care EveryWhere ID. This could be used by other organizations to access your [place] medical records  [id number]        Your Vitals Were     Pulse Temperature Height Last Period Pulse Oximetry BMI (Body Mass Index)    66 99  F (37.2  C) (Tympanic) 4' 10.5\" (1.486 m) 2017 100% 25.06 kg/m2       Blood Pressure from Last 3 Encounters:   17 136/68   17 110/64   16 120/80    Weight from Last 3 Encounters:   17 122 lb (55.3 kg)   17 118 lb (53.5 kg)   16 115 lb (52.2 kg)              We Performed the Following     Comprehensive metabolic panel     [facility] PT, HAND, AND CHIROPRACTIC REFERRAL     Vitamin D Deficiency  "         Today's Medication Changes          These changes are accurate as of: 7/26/17 12:09 PM.  If you have any questions, ask your nurse or doctor.               Start taking these medicines.        Dose/Directions    ALPRAZolam 0.25 MG tablet   Commonly known as:  XANAX   Used for:  Anxiety and depression   Started by:  Maria Victoria Lau MD        Dose:  0.25 mg   Take 1 tablet (0.25 mg) by mouth 3 times daily as needed for anxiety   Quantity:  14 tablet   Refills:  0       sertraline 25 MG tablet   Commonly known as:  ZOLOFT   Used for:  Insomnia, unspecified type, Anxiety and depression   Started by:  Maria Victoria Lau MD        Dose:  25 mg   Take 1 tablet (25 mg) by mouth daily   Quantity:  30 tablet   Refills:  1         These medicines have changed or have updated prescriptions.        Dose/Directions    meclizine 25 MG tablet   Commonly known as:  ANTIVERT   This may have changed:  See the new instructions.   Used for:  BPPV (benign paroxysmal positional vertigo), unspecified laterality   Changed by:  Maria Victoria Lau MD        Dose:  25 mg   Take 1 tablet (25 mg) by mouth 3 times daily as needed for dizziness   Quantity:  30 tablet   Refills:  0            Where to get your medicines      These medications [id number]  [facility] [place] [facility] [address] [facility] [address]  [address] [facility] [address]     Phone:  [phone]     meclizine 25 MG tablet    sertraline 25 MG tablet         Some of these will need a paper prescription and others can be bought over the counter.  Ask your nurse if you have questions.     Bring a paper prescription for each of these medications     ALPRAZolam 0.25 MG tablet                Primary Care Provider Office Phone # Fax #    Reena Correa [phone]       [facility] [place] [address]  [place] [address]        Equal Access to Services     BETH SHAW AH: Carol Ann hurtado  Sospringali, waaxda luqadaha, qaybta kaalmada alexey, vazquez awlls. So [facility] [phone].    ATENCIÓN: Si franko álvarez, tiene a hines disposición servicios gratuitos de asistencia lingüística. Maricarmen al [phone].    We comply with applicable federal civil rights laws and [place] laws. We do not discriminate on the basis of race, color, national origin, age, disability sex, sexual orientation or gender identity.            Thank you!     Thank you for choosing [facility] [place]  for your care. Our goal is always to provide you with excellent care. Hearing back from our patients is one way we can continue to improve our services. Please take a few minutes to complete the written survey that you may receive in the mail after your visit with us. Thank you!             Your Updated Medication List - Protect others around you: Learn how to safely use, store and throw away your medicines at www.disposemymeds.org.          This list is accurate as of: 7/26/17 12:09 PM.  Always use your most recent med list.                   Brand Name Dispense Instructions for use Diagnosis    ALPRAZolam 0.25 MG tablet    XANAX    14 tablet    Take 1 tablet (0.25 mg) by mouth 3 times daily as needed for anxiety    Anxiety and depression       Biotin 5 MG Tabs           COENZYME Q-10 PO           cyclobenzaprine 10 MG tablet    FLEXERIL    30 tablet    Take 0.5-1 tablets (5-10 mg) by mouth 3 times daily as needed for muscle spasms    Back muscle spasm       FISH OIL + D3 PO           FOLIC ACID PO      Take 1 mg by mouth daily        LICORICE ROOT PO           lisinopril-hydrochlorothiazide 20-25 MG per tablet    PRINZIDE/ZESTORETIC    90 tablet    Take 1 tablet by mouth daily    Essential hypertension with goal blood pressure less than 140/90       meclizine 25 MG tablet    ANTIVERT    30 tablet    Take 1 tablet (25 mg) by mouth 3 times daily as needed for dizziness    BPPV (benign paroxysmal  positional vertigo), unspecified laterality       milk thistle extract 140 MG Caps capsule           omeprazole 40 MG capsule    priLOSEC    90 capsule    TAKE 1 CAPSULE BY MOUTH EVERY DAY    Gastroesophageal reflux disease without esophagitis       ondansetron 4 MG tablet    ZOFRAN    18 tablet    Take 1 tablet (4 mg) by mouth every 8 hours as needed for nausea    Nausea       potassium chloride SA 20 MEQ CR tablet    potassium chloride     Take 1 tablet (20 mEq) by mouth daily    Hypokalemia       sertraline 25 MG tablet    ZOLOFT    30 tablet    Take 1 tablet (25 mg) by mouth daily    Insomnia, unspecified type, Anxiety and depression       traZODone 50 MG tablet    DESYREL    30 tablet    TAKE 1 TABLET BY MOUTH EVERY NIGHT AT BEDTIME    Insomnia, unspecified type       VITAMIN C PO           VITAMIN E COMPLEX PO      Take 400 Units by mouth

## 2017-07-26 NOTE — PATIENT INSTRUCTIONS
Your Body s Response to Anxiety    Normal anxiety is part of the body s natural defense system. It's an alert to a threat that is unknown, vague, or comes from your own internal fears. While you re in this state, your feelings can range from a vague sense of worry to physical sensations such as a pounding heartbeat. These feelings make you want to react to the threat. An anxiety response is normal in many situations. But when you have an anxiety disorder, the same response can occur at the wrong times.  Anxiety can be helpful  Normal anxiety is a signal from your brain that warns you of a threat and is a normal response to help you prevent something or decrease the bad effects of something you can't control. For example, anxiety is a normal response to situations that might damage your body, separate you from a loved one, or lose your job. The symptoms of anxiety can be physical and mental.  How does it feel?  At certain times, people with anxiety may have:    Dizziness    Muscle tension or pain    Restlessness    Sleeplessness    Trouble concentrating    Racing heartbeat    Fast breathing    Shaking or trembling    Stomachache    Diarrhea    Loss of energy    Sweating    Cold, clammy hands    Chest pain    Dry mouth  Anxiety can also be a problem  Anxiety can become a problem when it is hard to control, occurs for months, and interferes with important parts of your life. With an anxiety disorder, your body has the response described above, but in inappropriate ways. The response a person has depends on the anxiety disorder he or she has. With some disorders, the anxiety is way out of proportion to the threat that triggers it. With others, anxiety may occur even when there isn t a clear threat or trigger.  Who does it affect?  Some people are more prone to persistent anxiety than others. It tends to run in families, and it affects more younger people than older people, and more women than men. But no age, race, or  "gender is immune to anxiety problems.  Anxiety can be treated  The good news is that the anxiety that s disrupting your life can be treated. Check with your healthcare provider and rule out any physical problems that may be causing the anxiety symptoms. If an anxiety disorder is diagnosed seek mental healthcare. This is an illness and it can respond to treatment. Most types of anxiety disorders will respond to \"talk therapy\" and medicines. Working with your doctor or other healthcare provider, you can develop skills to help you cope with anxiety. You can also gain the perspective you need to overcome your fears. Note: Good sources of support or guidance can be found at your local hospital, mental health clinic, or an employee assistance program.  How to cope with anxiety  If anxiety is wearing you down, here are some things you can do to cope:    Keep in mind that you can t control everything about a situation. Change what you can and let the rest take its course.    Exercise--it s a great way to relieve tension and help your body feel relaxed.    Avoid caffeine and nicotine, which can make anxiety symptoms worse.    Fight the temptation to turn to alcohol or unprescribed drugs for relief. They only make things worse in the long run.    Educate yourself about anxiety disorders. Keep track of helpful online resources and books you can use during stressful periods.    Try stress management techniques such as meditation.    Consider online or in-person support groups.   Date Last Reviewed: 1/1/2017 2000-2017 [employer]. [address] [place] [id number]. All rights reserved. This information is not intended as a substitute for professional medical care. Always follow your healthcare professional's instructions.        Stress Relief: Relaxation  Focusing the mind helps provide stress relief. Taking 5 to 10 minutes to practice relaxation each day helps you feel more refreshed. The following " "exercises can be done almost anywhere. Try one or more until you find what works best for you.  Calm your mind    Find a quiet place where you won't be disturbed. Then try the following:    Sit comfortably. Take off your shoes. Turn off your cell phone and pager. Take a few deep breaths.    Focus your mind on one peaceful thought, image, or word. Then try to hold that thought for 5 minutes.    When other thoughts enter your mind, relax and refocus. Let the invading thoughts fall away.    When you're done, stand up slowly and stretch your arms over your head. With practice, this exercise can help you feel restored.     Calm your body  With practice, you can use mental cues to tell your body how to feel.    Sit comfortably and clear your mind. A few deep breaths will help.    Mentally focus on your left hand and repeat to yourself, \"My left hand feels warm and heavy.\" Keep doing this until your hand does feel heavier and warmer.    Repeat the exercise using your right hand. Then focus on your arms, legs, and feet until your whole body feels relaxed.    When you're done, stand up slowly and stretch your arms overhead.     Visualization  Visualization is like taking a mental vacation. It frees your mind while keeping your body in a calm state. To get started, picture yourself feeling warm and relaxed. Choose a peaceful setting that appeals to you and fill in the details. If you imagine a tropical beach, listen to the waves on the shore. Feel the sun on your face. Dig your toes in the sand. By using the power of your mind, you can take a soothing break when you need to.  Date Last Reviewed: 1/1/2017 2000-2017 [employer]. [address] [place] [address], [place] [address]. All rights reserved. This information is not intended as a substitute for professional medical care. Always follow your healthcare professional's instructions.        "

## 2017-07-27 LAB
ALBUMIN SERPL-MCNC: 4.1 G/DL (ref 3.4–5)
ALP SERPL-CCNC: 33 U/L (ref 40–150)
ALT SERPL W P-5'-P-CCNC: 26 U/L (ref 0–50)
ANION GAP SERPL CALCULATED.3IONS-SCNC: 7 MMOL/L (ref 3–14)
AST SERPL W P-5'-P-CCNC: 12 U/L (ref 0–45)
BILIRUB SERPL-MCNC: 0.2 MG/DL (ref 0.2–1.3)
BUN SERPL-MCNC: 17 MG/DL (ref 7–30)
CALCIUM SERPL-MCNC: 8.7 MG/DL (ref 8.5–10.1)
CHLORIDE SERPL-SCNC: 107 MMOL/L (ref 94–109)
CO2 SERPL-SCNC: 27 MMOL/L (ref 20–32)
CREAT SERPL-MCNC: 0.78 MG/DL (ref 0.52–1.04)
DEPRECATED CALCIDIOL+CALCIFEROL SERPL-MC: 26 UG/L (ref 20–75)
GFR SERPL CREATININE-BSD FRML MDRD: 80 ML/MIN/1.7M2
GLUCOSE SERPL-MCNC: 84 MG/DL (ref 70–99)
POTASSIUM SERPL-SCNC: 3.6 MMOL/L (ref 3.4–5.3)
PROT SERPL-MCNC: 8 G/DL (ref 6.8–8.8)
SODIUM SERPL-SCNC: 141 MMOL/L (ref 133–144)

## 2017-07-27 ASSESSMENT — ANXIETY QUESTIONNAIRES: GAD7 TOTAL SCORE: 15

## 2017-07-27 ASSESSMENT — PATIENT HEALTH QUESTIONNAIRE - PHQ9: SUM OF ALL RESPONSES TO PHQ QUESTIONS 1-9: 16

## 2017-08-04 ENCOUNTER — THERAPY VISIT (OUTPATIENT)
Dept: PHYSICAL THERAPY | Facility: CLINIC | Age: 47
End: 2017-08-04
Payer: COMMERCIAL

## 2017-08-04 DIAGNOSIS — G44.209 TENSION TYPE HEADACHE: ICD-10-CM

## 2017-08-04 DIAGNOSIS — M54.2 CERVICALGIA: Primary | ICD-10-CM

## 2017-08-04 PROCEDURE — 97110 THERAPEUTIC EXERCISES: CPT | Mod: GP | Performed by: PHYSICAL THERAPIST

## 2017-08-04 PROCEDURE — 97161 PT EVAL LOW COMPLEX 20 MIN: CPT | Mod: GP | Performed by: PHYSICAL THERAPIST

## 2017-08-04 NOTE — PROGRESS NOTES
Subjective:    Patient is a 47 year old female presenting with rehab left ankle/foot hpi.                                      Pertinent medical history includes:  High blood pressure, depression and other (Anxiety).  Medical allergies: no.    Current medications:  Sleep medication, muscle relaxants, high blood pressure medication and anti-depressants.  Current occupation is RN, Student.    Primary job tasks include:  Prolonged standing and other (Computer work).                                Objective:    System    Physical Exam    General     ROS    Assessment/Plan:

## 2017-08-04 NOTE — PROGRESS NOTES
Subjective:    Patient is a 47 year old female presenting with rehab cervical spine hpi.   Michelle Toussaint is a 47 year old female with a cervical spine condition.  Condition occurred with:  Insidious onset.  Condition occurred: for unknown reasons.  This is a new condition  Patient is referred with a 2-3 month hx of mid and lower cervical pain and temporal/frontal headaches. Sxs tend to be worse with neck flexion and when lying down. She also notes intermittent dizziness..    Patient reports pain:  Lower cervical spine and mid cervical spine.  Radiates to:  Head.  Pain is described as aching and is intermittent and reported as 3/10.  Associated symptoms:  Headache. Pain is worse during the day and worse in the A.M..  Symptoms are exacerbated by looking up or down and relieved by rest.  Since onset symptoms are gradually worsening.        General health as reported by patient is excellent.        Current medications:  Muscle relaxants, anti-depressants and high blood pressure medication.  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  Primary job tasks include:  Prolonged sitting.    Barriers include:  None as reported by the patient.    Red flags:  None as reported by the patient.                        Objective:    System    Physical Exam    Lori Cervical Evaluation    Posture:  Sitting: fair  Standing: fair  Protruding Head: yes  Wry Neck: no  Correction of Posture: no effect  Other Observations: Dowager's hump  Movement Loss:  Protrusion (PRO): nil  Flexion (Flex): nil  Retraction (RET): nil  Extension (EXT): nil  Lateral Flexion Right (LF R): nil  Lateral Flexion Left (LF L): nil  Rotation Right (ROT R): nil  Rotation Left (ROT L): nil  Test Movements:  Pretest Pain Sitting: no pain  PRO: During: no effect  After: no effect  Mechanical Response: no effect  Repeat PRO: During: no effect  After: no effect  Mechanical Response: no effect  RET: During: no effect   After: no effect  Mechanical Response: no effect  Repeat RET: During: no effect  After: no effect  Mechanical Response: no effect  RET EXT: During: no effect  After: no effect  Mechanical Response: no effect  Repeat RET EXT: During: no effect  After: no effect  Mechanical Response: no effect                        Conclusion: posture  Principle of Treatment:  Posture Correction: Trial of lumbar roll and TYON    Extension: retraction and ret/ext x 10/ 2 hours                                             ROS    Assessment/Plan:      Patient is a 47 year old female with cervical complaints.    Patient has the following significant findings with corresponding treatment plan.                Diagnosis 1:  Neck pain and headaches  Pain -  manual therapy, self management, education, directional preference exercise and home program  Impaired posture - neuro re-education and home program    Therapy Evaluation Codes:   1) History comprised of:   Personal factors that impact the plan of care:      Overall behavior pattern.    Comorbidity factors that impact the plan of care are:      Depression.     Medications impacting care: None.  2) Examination of Body Systems comprised of:   Body structures and functions that impact the plan of care:      Cervical spine.   Activity limitations that impact the plan of care are:      Reading/Computer work and Sitting.  3) Clinical presentation characteristics are:   Stable/Uncomplicated.  4) Decision-Making    Low complexity using standardized patient assessment instrument and/or measureable assessment of functional outcome.  Cumulative Therapy Evaluation is: Low complexity.    Previous and current functional limitations:  (See Goal Flow Sheet for this information)    Short term and Long term goals: (See Goal Flow Sheet for this information)     Communication ability:  Patient appears to be able to clearly communicate and understand verbal and written communication and follow directions  correctly.  Treatment Explanation - The following has been discussed with the patient:   RX ordered/plan of care  Anticipated outcomes  Possible risks and side effects  This patient would benefit from PT intervention to resume normal activities.   Rehab potential is good.    Frequency:  1 X week, once daily  Duration:  for 4 weeks  Discharge Plan:  Achieve all LTG.  Independent in home treatment program.  Reach maximal therapeutic benefit.    Please refer to the daily flowsheet for treatment today, total treatment time and time spent performing 1:1 timed codes.

## 2017-08-04 NOTE — MR AVS SNAPSHOT
After Visit Summary   8/4/2017    Michelle Toussaint    MRN: [id number]           Patient Information     Date Of Birth          1970        Visit Information        Provider Department      8/4/2017 1:10 PM Henok Taylor, ROXANN [facility] [place] [facility]        Today's Diagnoses     Cervicalgia    -  1    Tension type headache           Follow-ups after your visit        Your next 10 appointments already scheduled     Aug 18, 2017  1:10 PM CDT   WIL[facility] Spine with Henok Taylor PT   [facility] [place] [facility] ([facility] [place])    [address] [place] [address]  [address]  [place] [address]   [phone]            Sep 05, 2017  1:30 PM CDT   [facility]IL[facility] Spine with Henok Taylor PT   [facility] [place] [facility] ([facility] [place])    [address] [place] [address]  [address]  [place] [address]   [phone]              Who to contact     If you have questions or need follow up information about today's clinic visit or your schedule please contact [facility] directly at [phone].  Normal or non-critical lab and imaging results will be communicated to you by PivotLinkhart, letter or phone within 4 business days after the clinic has received the results. If you do not hear from us within 7 days, please contact the clinic through PivotLinkhart or phone. If you have a critical or abnormal lab result, we will notify you by phone as soon as possible.  Submit refill requests through Joshfire or call your pharmacy and they will forward the refill request to us. Please allow 3 business days for your refill to be completed.          Additional Information About Your Visit        PivotLinkhart Information     Joshfire gives you secure access to your electronic health record. If you see a primary care provider, you can also send messages to your care team and make appointments. If you have  questions, please call your primary care clinic.  If you do not have a primary care provider, please call [phone] and they will assist you.        Care EveryWhere ID     This is your Care EveryWhere ID. This could be used by other organizations to access your [place] medical records  [id number]        Your Vitals Were     Last Period                   07/22/2017            Blood Pressure from Last 3 Encounters:   07/26/17 136/68   01/20/17 110/64   12/19/16 120/80    Weight from Last 3 Encounters:   07/26/17 55.3 kg (122 lb)   01/20/17 53.5 kg (118 lb)   12/19/16 52.2 kg (115 lb)              We Performed the Following     HC PT EVAL, LOW COMPLEXITY     WILFRID INITIAL EVAL REPORT     THERAPEUTIC EXERCISES        Primary Care Provider Office Phone # Fax #    Reena Correa [phone]       [facility] [place] [address] [place] [address] [address]  [place] [address]        Equal Access to Services     BETH SHAW AH: Hadii aad ku hadasho Soomaali, waaxda luqadaha, qaybta kaalmada adeegyada, waxay idiin hayaan adeeg kharamaylin la'ritun . So [facility] [phone].    ATENCIÓN: Si habla español, tiene a hines disposición servicios gratuitos de asistencia lingüística. Llame al [phone].    We comply with applicable federal civil rights laws and [place] laws. We do not discriminate on the basis of race, color, national origin, age, disability sex, sexual orientation or gender identity.            Thank you!     Thank you for choosing [facility]  for your care. Our goal is always to provide you with excellent care. Hearing back from our patients is one way we can continue to improve our services. Please take a few minutes to complete the written survey that you may receive in the mail after your visit with us. Thank you!             Your Updated Medication List - Protect others around you: Learn how to safely use, store and throw away your medicines at  www.disposemymeds.org.          This list is accurate as of: 8/4/17  1:51 PM.  Always use your most recent med list.                   Brand Name Dispense Instructions for use Diagnosis    ALPRAZolam 0.25 MG tablet    XANAX    14 tablet    Take 1 tablet (0.25 mg) by mouth 3 times daily as needed for anxiety    Anxiety and depression       Biotin 5 MG Tabs           COENZYME Q-10 PO           cyclobenzaprine 10 MG tablet    FLEXERIL    30 tablet    Take 0.5-1 tablets (5-10 mg) by mouth 3 times daily as needed for muscle spasms    Back muscle spasm       FISH OIL + D3 PO           FOLIC ACID PO      Take 1 mg by mouth daily        LICORICE ROOT PO           lisinopril-hydrochlorothiazide 20-25 MG per tablet    PRINZIDE/ZESTORETIC    90 tablet    Take 1 tablet by mouth daily    Essential hypertension with goal blood pressure less than 140/90       meclizine 25 MG tablet    ANTIVERT    30 tablet    Take 1 tablet (25 mg) by mouth 3 times daily as needed for dizziness    BPPV (benign paroxysmal positional vertigo), unspecified laterality       milk thistle extract 140 MG Caps capsule           omeprazole 40 MG capsule    priLOSEC    90 capsule    TAKE 1 CAPSULE BY MOUTH EVERY DAY    Gastroesophageal reflux disease without esophagitis       ondansetron 4 MG tablet    ZOFRAN    18 tablet    Take 1 tablet (4 mg) by mouth every 8 hours as needed for nausea    Nausea       potassium chloride SA 20 MEQ CR tablet    potassium chloride     Take 1 tablet (20 mEq) by mouth daily    Hypokalemia       sertraline 25 MG tablet    ZOLOFT    30 tablet    Take 1 tablet (25 mg) by mouth daily    Insomnia, unspecified type, Anxiety and depression       traZODone 50 MG tablet    DESYREL    30 tablet    TAKE 1 TABLET BY MOUTH EVERY NIGHT AT BEDTIME    Insomnia, unspecified type       VITAMIN C PO           VITAMIN E COMPLEX PO      Take 400 Units by mouth

## 2017-08-23 ENCOUNTER — TELEPHONE (OUTPATIENT)
Dept: FAMILY MEDICINE | Facility: CLINIC | Age: 47
End: 2017-08-23

## 2017-08-23 DIAGNOSIS — F32.A ANXIETY AND DEPRESSION: ICD-10-CM

## 2017-08-23 DIAGNOSIS — F41.9 ANXIETY AND DEPRESSION: ICD-10-CM

## 2017-08-24 RX ORDER — ALPRAZOLAM 0.25 MG
TABLET ORAL
Qty: 10 TABLET | Refills: 0 | Status: SHIPPED | OUTPATIENT
Start: 2017-08-24

## 2017-08-24 NOTE — TELEPHONE ENCOUNTER
Alprazolam      Last Written Prescription Date:  7/26/17  Last Fill Quantity: 14,   # refills: 0  Last Office Visit with [facility] or [facility] prescribing provider: 7/26/17  Future Office visit:       Routing refill request to provider for review/approval because:  Drug not on the [facility], [facility] or [facility] refill protocol or controlled substance    Gissell Allan CMA

## 2017-08-24 NOTE — TELEPHONE ENCOUNTER
Patient given message from Dr. Lau. Patient states that she is taking Zoloft daily. Patient scheduled med check for Wednesday 8/30. Rashmi Chamberlain RN

## 2017-08-24 NOTE — TELEPHONE ENCOUNTER
Ok for refill. Script printed. Ok to give to patient. Also please verify to make sure she is taking her Zoloft daily    Remind pt to do follow up for med check  since she is due.

## 2017-08-24 NOTE — TELEPHONE ENCOUNTER
Non-detailed message left to return our call.  Kiki Rae RN - Triage  [facility]

## 2017-08-25 NOTE — TELEPHONE ENCOUNTER
Called patient to see how many tablets she has left.  Non-detailed message left to return our call.  Kiki Rae RN - Triage  [facility]

## 2017-08-25 NOTE — TELEPHONE ENCOUNTER
[facility] calling; they do not have the script  Script sitting on Provider keyboard (Maria Victoria Lau MD)  Can this wait until Monday or will another Provider sign?  Gregoria Gonzalez TC

## 2017-08-28 NOTE — TELEPHONE ENCOUNTER
Prescription of alprazolam was faxed to [facility]    Date:August 28, 2017  Signature:Gregoria ZAPATA

## 2017-08-30 ENCOUNTER — OFFICE VISIT (OUTPATIENT)
Dept: FAMILY MEDICINE | Facility: CLINIC | Age: 47
End: 2017-08-30
Payer: COMMERCIAL

## 2017-08-30 VITALS
OXYGEN SATURATION: 98 % | BODY MASS INDEX: 24.15 KG/M2 | SYSTOLIC BLOOD PRESSURE: 122 MMHG | WEIGHT: 119.8 LBS | DIASTOLIC BLOOD PRESSURE: 83 MMHG | HEIGHT: 59 IN | TEMPERATURE: 97.3 F | HEART RATE: 59 BPM

## 2017-08-30 DIAGNOSIS — G47.00 INSOMNIA, UNSPECIFIED TYPE: ICD-10-CM

## 2017-08-30 DIAGNOSIS — F41.9 ANXIETY AND DEPRESSION: ICD-10-CM

## 2017-08-30 DIAGNOSIS — F32.A ANXIETY AND DEPRESSION: ICD-10-CM

## 2017-08-30 DIAGNOSIS — I10 ESSENTIAL HYPERTENSION WITH GOAL BLOOD PRESSURE LESS THAN 140/90: ICD-10-CM

## 2017-08-30 PROCEDURE — 99214 OFFICE O/P EST MOD 30 MIN: CPT | Performed by: FAMILY MEDICINE

## 2017-08-30 RX ORDER — TRAZODONE HYDROCHLORIDE 50 MG/1
50 TABLET, FILM COATED ORAL AT BEDTIME
Qty: 30 TABLET | Refills: 3 | Status: CANCELLED | OUTPATIENT
Start: 2017-08-30

## 2017-08-30 RX ORDER — SERTRALINE HYDROCHLORIDE 25 MG/1
25 TABLET, FILM COATED ORAL DAILY
Qty: 30 TABLET | Refills: 1 | Status: CANCELLED | OUTPATIENT
Start: 2017-08-30

## 2017-08-30 RX ORDER — LISINOPRIL AND HYDROCHLOROTHIAZIDE 20; 25 MG/1; MG/1
1 TABLET ORAL DAILY
Qty: 90 TABLET | Refills: 2 | Status: SHIPPED | OUTPATIENT
Start: 2017-08-30 | End: 2017-11-30

## 2017-08-30 ASSESSMENT — ANXIETY QUESTIONNAIRES
GAD7 TOTAL SCORE: 11
3. WORRYING TOO MUCH ABOUT DIFFERENT THINGS: NEARLY EVERY DAY
IF YOU CHECKED OFF ANY PROBLEMS ON THIS QUESTIONNAIRE, HOW DIFFICULT HAVE THESE PROBLEMS MADE IT FOR YOU TO DO YOUR WORK, TAKE CARE OF THINGS AT HOME, OR GET ALONG WITH OTHER PEOPLE: VERY DIFFICULT
2. NOT BEING ABLE TO STOP OR CONTROL WORRYING: SEVERAL DAYS
6. BECOMING EASILY ANNOYED OR IRRITABLE: NOT AT ALL
5. BEING SO RESTLESS THAT IT IS HARD TO SIT STILL: SEVERAL DAYS
1. FEELING NERVOUS, ANXIOUS, OR ON EDGE: MORE THAN HALF THE DAYS
7. FEELING AFRAID AS IF SOMETHING AWFUL MIGHT HAPPEN: SEVERAL DAYS

## 2017-08-30 ASSESSMENT — PATIENT HEALTH QUESTIONNAIRE - PHQ9
SUM OF ALL RESPONSES TO PHQ QUESTIONS 1-9: 18
5. POOR APPETITE OR OVEREATING: NEARLY EVERY DAY

## 2017-08-30 NOTE — MR AVS SNAPSHOT
After Visit Summary   8/30/2017    Michelle Toussaint    MRN: [id number]           Patient Information     Date Of Birth          1970        Visit Information        Provider Department      8/30/2017 1:00 PM Maria Victoria Lau [place] [facility] [place]        Today's Diagnoses     Insomnia, unspecified type        Anxiety and depression        Essential hypertension with goal blood pressure less than 140/90          Care Instructions      Insomnia  Insomnia is repeated difficulty going to sleep or staying asleep, or both. Whether you have insomnia is not defined by a specific amount of sleep. Different people need different amounts of sleep, and you may need more or less sleep at different times of your life.  There are 3 major types of insomnia:  short-term, chronic, and  other.   Short-term, or acute insomnia lasts less than 3 months.  The symptoms are temporary and can be linked directly to a stressor, such as the death of a loved one, financial problems, or a new physical problem.  Short-term insomnia stops when the stressor resolves or the person adapts to its presence.  Chronic insomnia occurs at least 3 times a week and lasts longer than 3 months.  Chronic insomnia can occur when either the cause of the sleeping problem is not clear, or the insomnia does not get better when the stressor is resolved. A number of other criteria are also used to make the diagnosis of chronic insomnia.    Other insomnia  is the third type of insomnia-related sleep disorders.  This description applies to people who have problems getting to sleep or staying asleep, but do not meet all of the factors that describe either short-term or chronic insomnia.    Many things cause insomnia. Different people may have different causes. It can be from an underlying medical or psychological condition, or lifestyle. It can also be primary insomnia, which means no cause can be found.  Causes of insomnia  include:    Chronic medical problems- heart disease, gastrointestinal problems, hormonal changes, breathing problems    Anxiety    Stress    Depression    Pain    Work schedule    Sleep apnea    Illegal drugs    Certain medicines  Many different medidcines can affect your sleep, such as stimulants, caffeine, alcohol, some decongestants, and diet pills. Other medicines may include some types of blood pressure pills, steroids, asthma medicines, antihistamines, antidepressants, seizure medicines and statins. Not all of these will affect your sleep, and they shouldn t be stopped without talking to your doctor.  Symptoms of insomnia can include:    Lying awake for long periods at night before falling asleep    Waking up several times during the night    Waking up early in the morning and not being able to get back to sleep    Feeling tired and not refreshed by sleep    Not being able to function properly during the day and finding it hard to concentrate    Irritability    Tiredness and fatigue during the day  Home care  1. Review your medicines with your doctor or pharmacist to find out if they can cause insomnia. Not all medicines will affect your sleep, but they shouldn't be stopped without reviewing them with your doctor. There may be serious side effects and consequences from suddenly stopping your medicines. Not taking them may cause strokes, heart attacks, and many other problems.  2. Caffeine, smoking and alcohol also affect sleep. Limit your daily use and do not use these before bedtime. Alcohol may make you sleepy at first, but as its effects wear off, you may awaken a few hours later and have trouble returning to sleep.  3. Do not exercise, eat or drink large amounts of liquid within 2 hours of your bedtime.  4. Improve your sleep habits. Have a fixed bed and wake-up time. Try to keep noise, light and heat in your bedroom at a comfortable level. Try using earplugs or eyeshades if needed.   5. Avoid watching TV  in bed.  6. If you do not fall asleep within 30 minutes, try to relax by reading or listening to soft music.  7. Limit daytime napping to one 30 minute period, early in the day.  8. Get regular exercise. Find other ways to lessen your stress level.  9. If a medicine was prescribed to help reset your sleep patterns, take it as directed. Sleeping pills are intended for short-term use, only. If taken for too long, the effect wears off while the risk of physical addiction and psychological dependence increases.  Sleep diary  If the cause isn t obvious and it is not improving, try keeping a  sleep diary  for a couple of weeks. Include in it:    The time you go to bed    How long it takes to fall asleep    How many times you wake up    What time you wake up    Your meal times and what you eat    What time you drink alcohol    Your exercise habits and times  Follow-up care  Follow up with your healthcare provider, or as advised. If X-rays or CT scans were done, you will be notified if there is a change in the reading, especially if it affects treatment.  Call 911  Call 911 if any of these occur:    Trouble breathing    Confusion or trouble waking    Fainting or loss of consciousness    Rapid heart rate    New chest, arm, shoulder, neck or upper back pain    Trouble with speech or vision, weakness of an arm or leg    Trouble walking or talking, loss of balance, numbness or weakness in one side of your body, facial droop  When to seek medical advice  Call your healthcare provider right away if any of these occur:    Extreme restlessness or irritability    Confusion or hallucinations (seeing or hearing things that are not there)    Anxiety, depression    Several days without sleeping  Date Last Reviewed: 11/19/2015 2000-2017 [employer]. [address] [place] [address], [place] [address]. All rights reserved. This information is not intended as a substitute for professional medical care. Always follow your healthcare  professional's instructions.        Depression: Tips to Help Yourself  As your healthcare providers help treat your depression, you can also help yourself. Keep in mind that your illness affects you emotionally, physically, mentally, and socially. So full recovery will take time. Take care of your body and your soul, and be patient with yourself as you get better.    Self-care    Educate yourself. Read about treatment and medicine options. If you have the energy, attend local conferences or support groups. Keep a list of useful websites and helpful books and use them as needed. This illness is not your fault. Don t blame yourself for your depression.    Manage early symptoms. If you notice symptoms returning, experience triggers, or identify other factors that may lead to a depressive episode, get help as soon as possible. Ask trusted friends and family to monitor your behavior and let you know if they see anything of concern.    Work with your provider. Find a provider you can trust. Communicate honestly with that person and share information on your treatment for depression and your reaction to medicines.    Be prepared for a crisis. Know what to do if you experience a crisis. Keep the phone number of a crisis hotline and know the location of your community's urgent care centers and the closest emergency department.    Hold off on big decisions. Depression can cloud your judgment. So wait until you feel better before making major life decisions, such as changing jobs, moving, or getting  or .    Be patient. Recovering from depression is a process. Don t be discouraged if it takes some time to feel better.    Keep it simple. Depression saps your energy and concentration. So you won t be able to do all the things you used to do. Set small goals and do what you can.    Be with others. Don t isolate yourself--you ll only feel worse. Try to be with other people. And take part in fun activities when you  can. Go to a movie, ballgame, [employer] service, or social event. Talk openly with people you can trust. And accept help when it s offered.  Take care of your body  People with depression often lose the desire to take care of themselves. That only makes their problems worse. During treatment and afterward, make a point to:    Exercise. It s a great way to take care of your body. And studies have shown that exercise helps fight depression.    Avoid drugs and alcohol. These may ease the pain in the short term. But they ll only make your problems worse in the long run.    Get relief from stress. Ask your healthcare provider for relaxation exercises and techniques to help relieve stress.    Eat right. A balanced and healthy diet helps keep your body healthy.  Date Last Reviewed: 1/1/2017 2000-2017 [employer]. [address] [place] [address]. All rights reserved. This information is not intended as a substitute for professional medical care. Always follow your healthcare professional's instructions.                Follow-ups after your visit        Additional Services     MENTAL HEALTH REFERRAL       Your provider has referred you to: [facility]: [facility] - Counseling (Individual/Couples/Family) - [facility] [phone]   [email or web address]   *Patient will be contacted by [facility] scheduling partner, [facility]), to schedule an appointment.  Patients may also call P to schedule.  [facility] [phone]   [email or web address]   *Patient will be contacted by [facility] scheduling partner, [facility] ([facility]), to schedule an appointment.  Patients may also call P to schedule.    All scheduling is subject to the client's specific insurance plan & benefits, provider/location availability, and provider  "clinical specialities.  Please arrive 15 minutes early for your first appointment and bring your completed paperwork.    Please be aware that coverage of these services is subject to the terms and limitations of your health insurance plan.  Call member services at your health plan with any benefit or coverage questions.                  Who to contact     If you have questions or need follow up information about today's clinic visit or your schedule please contact [facility] directly at [phone].  Normal or non-critical lab and imaging results will be communicated to you by [employer] Network[employer], letter or phone within 4 business days after the clinic has received the results. If you do not hear from us within 7 days, please contact the clinic through M[employer] or phone. If you have a critical or abnormal lab result, we will notify you by phone as soon as possible.  Submit refill requests through [employer] or call your pharmacy and they will forward the refill request to us. Please allow 3 business days for your refill to be completed.          Additional Information About Your Visit        Nexvet Information     [employer] gives you secure access to your electronic health record. If you see a primary care provider, you can also send messages to your care team and make appointments. If you have questions, please call your primary care clinic.  If you do not have a primary care provider, please call [phone] and they will assist you.        Care EveryWhere ID     This is your Care EveryWhere ID. This could be used by other organizations to access your [facility] medical records  [id number]        Your Vitals Were     Pulse Temperature Height Last Period Pulse Oximetry BMI (Body Mass Index)    59 97.3  F (36.3  C) (Tympanic) 4' 10.5\" (1.486 m) 08/21/2017 98% 24.61 kg/m2       Blood Pressure from Last 3 Encounters:   08/30/17 122/83   07/26/17 136/68   01/20/17 110/64    Weight from Last 3 Encounters:   08/30/17 " 119 lb 12.8 oz (54.3 kg)   07/26/17 122 lb (55.3 kg)   01/20/17 118 lb (53.5 kg)              We Performed the Following     MENTAL HEALTH REFERRAL          Today's Medication Changes          These changes are accurate as of: 8/30/17  1:40 PM.  If you have any questions, ask your nurse or doctor.               These medicines have changed or have updated prescriptions.        Dose/Directions    * sertraline 25 MG tablet   Commonly known as:  ZOLOFT   This may have changed:  Another medication with the same name was added. Make sure you understand how and when to take each.   Used for:  Insomnia, unspecified type, Anxiety and depression   Changed by:  Maria Victoria Lau MD        Dose:  25 mg   Take 1 tablet (25 mg) by mouth daily   Quantity:  30 tablet   Refills:  1       * sertraline 50 MG tablet   Commonly known as:  ZOLOFT   This may have changed:  You were already taking a medication with the same name, and this prescription was added. Make sure you understand how and when to take each.   Used for:  Insomnia, unspecified type, Anxiety and depression   Changed by:  Maria Victoria Lau MD        Dose:  50 mg   Take 1 tablet (50 mg) by mouth daily   Quantity:  30 tablet   Refills:  1       * Notice:  This list has 2 medication(s) that are the same as other medications prescribed for you. Read the directions carefully, and ask your doctor or other care provider to review them with you.         Where to get your medicines      These medications were sent to [facility] [address] [place], [place] - [address] AT [facility] [address]  [address] [facility] [address]     Phone:  [phone]     lisinopril-hydrochlorothiazide 20-25 MG per tablet    sertraline 50 MG tablet                Primary Care Provider Office Phone # Fax #    Reena Correa [phone]       [address] [place] [facility] [address]        Equal Access to Services     BETH SHAW AH: Carol Ann hurtado  Sospringali, waaxda luqadaha, qaybta kaalmada alexey, vazquez walls. So [facility] [phone].    ATENCIÓN: Si franko álvarez, tiene a hines disposición servicios gratuitos de asistencia lingüística. Maricarmen al [phone].    We comply with applicable federal civil rights laws and [place] laws. We do not discriminate on the basis of race, color, national origin, age, disability sex, sexual orientation or gender identity.            Thank you!     Thank you for choosing [facility]  for your care. Our goal is always to provide you with excellent care. Hearing back from our patients is one way we can continue to improve our services. Please take a few minutes to complete the written survey that you may receive in the mail after your visit with us. Thank you!             Your Updated Medication List - Protect others around you: Learn how to safely use, store and throw away your medicines at www.disposemymeds.org.          This list is accurate as of: 8/30/17  1:40 PM.  Always use your most recent med list.                   Brand Name Dispense Instructions for use Diagnosis    ALPRAZolam 0.25 MG tablet    XANAX    10 tablet    TAKE 1 TABLET BY MOUTH THREE TIMES DAILY AS NEEDED FOR ANXIETY    Anxiety and depression       Biotin 5 MG Tabs           COENZYME Q-10 PO           cyclobenzaprine 10 MG tablet    FLEXERIL    30 tablet    Take 0.5-1 tablets (5-10 mg) by mouth 3 times daily as needed for muscle spasms    Back muscle spasm       FISH OIL + D3 PO           FOLIC ACID PO      Take 1 mg by mouth daily        LICORICE ROOT PO           lisinopril-hydrochlorothiazide 20-25 MG per tablet    PRINZIDE/ZESTORETIC    90 tablet    Take 1 tablet by mouth daily    Essential hypertension with goal blood pressure less than 140/90       meclizine 25 MG tablet    ANTIVERT    30 tablet    Take 1 tablet (25 mg) by mouth 3 times daily as needed for dizziness    BPPV (benign paroxysmal  positional vertigo), unspecified laterality       milk thistle extract 140 MG Caps capsule           omeprazole 40 MG capsule    priLOSEC    90 capsule    TAKE 1 CAPSULE BY MOUTH EVERY DAY    Gastroesophageal reflux disease without esophagitis       ondansetron 4 MG tablet    ZOFRAN    18 tablet    Take 1 tablet (4 mg) by mouth every 8 hours as needed for nausea    Nausea       potassium chloride SA 20 MEQ CR tablet    potassium chloride     Take 1 tablet (20 mEq) by mouth daily    Hypokalemia       * sertraline 25 MG tablet    ZOLOFT    30 tablet    Take 1 tablet (25 mg) by mouth daily    Insomnia, unspecified type, Anxiety and depression       * sertraline 50 MG tablet    ZOLOFT    30 tablet    Take 1 tablet (50 mg) by mouth daily    Insomnia, unspecified type, Anxiety and depression       traZODone 50 MG tablet    DESYREL    30 tablet    TAKE 1 TABLET BY MOUTH EVERY NIGHT AT BEDTIME    Insomnia, unspecified type       VITAMIN C PO           VITAMIN E COMPLEX PO      Take 400 Units by mouth        * Notice:  This list has 2 medication(s) that are the same as other medications prescribed for you. Read the directions carefully, and ask your doctor or other care provider to review them with you.

## 2017-08-30 NOTE — PATIENT INSTRUCTIONS
Insomnia  Insomnia is repeated difficulty going to sleep or staying asleep, or both. Whether you have insomnia is not defined by a specific amount of sleep. Different people need different amounts of sleep, and you may need more or less sleep at different times of your life.  There are 3 major types of insomnia:  short-term, chronic, and  other.   Short-term, or acute insomnia lasts less than 3 months.  The symptoms are temporary and can be linked directly to a stressor, such as the death of a loved one, financial problems, or a new physical problem.  Short-term insomnia stops when the stressor resolves or the person adapts to its presence.  Chronic insomnia occurs at least 3 times a week and lasts longer than 3 months.  Chronic insomnia can occur when either the cause of the sleeping problem is not clear, or the insomnia does not get better when the stressor is resolved. A number of other criteria are also used to make the diagnosis of chronic insomnia.    Other insomnia  is the third type of insomnia-related sleep disorders.  This description applies to people who have problems getting to sleep or staying asleep, but do not meet all of the factors that describe either short-term or chronic insomnia.    Many things cause insomnia. Different people may have different causes. It can be from an underlying medical or psychological condition, or lifestyle. It can also be primary insomnia, which means no cause can be found.  Causes of insomnia include:    Chronic medical problems- heart disease, gastrointestinal problems, hormonal changes, breathing problems    Anxiety    Stress    Depression    Pain    Work schedule    Sleep apnea    Illegal drugs    Certain medicines  Many different medidcines can affect your sleep, such as stimulants, caffeine, alcohol, some decongestants, and diet pills. Other medicines may include some types of blood pressure pills, steroids, asthma medicines, antihistamines, antidepressants,  seizure medicines and statins. Not all of these will affect your sleep, and they shouldn t be stopped without talking to your doctor.  Symptoms of insomnia can include:    Lying awake for long periods at night before falling asleep    Waking up several times during the night    Waking up early in the morning and not being able to get back to sleep    Feeling tired and not refreshed by sleep    Not being able to function properly during the day and finding it hard to concentrate    Irritability    Tiredness and fatigue during the day  Home care  1. Review your medicines with your doctor or pharmacist to find out if they can cause insomnia. Not all medicines will affect your sleep, but they shouldn't be stopped without reviewing them with your doctor. There may be serious side effects and consequences from suddenly stopping your medicines. Not taking them may cause strokes, heart attacks, and many other problems.  2. Caffeine, smoking and alcohol also affect sleep. Limit your daily use and do not use these before bedtime. Alcohol may make you sleepy at first, but as its effects wear off, you may awaken a few hours later and have trouble returning to sleep.  3. Do not exercise, eat or drink large amounts of liquid within 2 hours of your bedtime.  4. Improve your sleep habits. Have a fixed bed and wake-up time. Try to keep noise, light and heat in your bedroom at a comfortable level. Try using earplugs or eyeshades if needed.   5. Avoid watching TV in bed.  6. If you do not fall asleep within 30 minutes, try to relax by reading or listening to soft music.  7. Limit daytime napping to one 30 minute period, early in the day.  8. Get regular exercise. Find other ways to lessen your stress level.  9. If a medicine was prescribed to help reset your sleep patterns, take it as directed. Sleeping pills are intended for short-term use, only. If taken for too long, the effect wears off while the risk of physical addiction and  psychological dependence increases.  Sleep diary  If the cause isn t obvious and it is not improving, try keeping a  sleep diary  for a couple of weeks. Include in it:    The time you go to bed    How long it takes to fall asleep    How many times you wake up    What time you wake up    Your meal times and what you eat    What time you drink alcohol    Your exercise habits and times  Follow-up care  Follow up with your healthcare provider, or as advised. If X-rays or CT scans were done, you will be notified if there is a change in the reading, especially if it affects treatment.  Call 911  Call 911 if any of these occur:    Trouble breathing    Confusion or trouble waking    Fainting or loss of consciousness    Rapid heart rate    New chest, arm, shoulder, neck or upper back pain    Trouble with speech or vision, weakness of an arm or leg    Trouble walking or talking, loss of balance, numbness or weakness in one side of your body, facial droop  When to seek medical advice  Call your healthcare provider right away if any of these occur:    Extreme restlessness or irritability    Confusion or hallucinations (seeing or hearing things that are not there)    Anxiety, depression    Several days without sleeping  Date Last Reviewed: 11/19/2015 2000-2017 [employer]. [address] [place] [address]. All rights reserved. This information is not intended as a substitute for professional medical care. Always follow your healthcare professional's instructions.        Depression: Tips to Help Yourself  As your healthcare providers help treat your depression, you can also help yourself. Keep in mind that your illness affects you emotionally, physically, mentally, and socially. So full recovery will take time. Take care of your body and your soul, and be patient with yourself as you get better.    Self-care    Educate yourself. Read about treatment and medicine options. If you have the energy, attend  local conferences or support groups. Keep a list of useful websites and helpful books and use them as needed. This illness is not your fault. Don t blame yourself for your depression.    Manage early symptoms. If you notice symptoms returning, experience triggers, or identify other factors that may lead to a depressive episode, get help as soon as possible. Ask trusted friends and family to monitor your behavior and let you know if they see anything of concern.    Work with your provider. Find a provider you can trust. Communicate honestly with that person and share information on your treatment for depression and your reaction to medicines.    Be prepared for a crisis. Know what to do if you experience a crisis. Keep the phone number of a crisis hotline and know the location of your community's urgent care centers and the closest emergency department.    Hold off on big decisions. Depression can cloud your judgment. So wait until you feel better before making major life decisions, such as changing jobs, moving, or getting  or .    Be patient. Recovering from depression is a process. Don t be discouraged if it takes some time to feel better.    Keep it simple. Depression saps your energy and concentration. So you won t be able to do all the things you used to do. Set small goals and do what you can.    Be with others. Don t isolate yourself you ll only feel worse. Try to be with other people. And take part in fun activities when you can. Go to a movie, ballgame, Taoist service, or social event. Talk openly with people you can trust. And accept help when it s offered.  Take care of your body  People with depression often lose the desire to take care of themselves. That only makes their problems worse. During treatment and afterward, make a point to:    Exercise. It s a great way to take care of your body. And studies have shown that exercise helps fight depression.    Avoid drugs and alcohol. These  may ease the pain in the short term. But they ll only make your problems worse in the long run.    Get relief from stress. Ask your healthcare provider for relaxation exercises and techniques to help relieve stress.    Eat right. A balanced and healthy diet helps keep your body healthy.  Date Last Reviewed: 1/1/2017 2000-2017 [employer]. [address] [place] [address], [place] [address]. All rights reserved. This information is not intended as a substitute for professional medical care. Always follow your healthcare professional's instructions.

## 2017-08-30 NOTE — PROGRESS NOTES
SUBJECTIVE:   Michelle Toussaint is a 47 year old female who presents to clinic today for the following health issues:      Depression and Anxiety Follow-Up    Status since last visit: feeling slightly better but not enough. Feels anxious sometimes, not sleeping too well, she has not been taking  trazodone, only tried it once , she though it made her tired next day, but willing to try it again , bc she know she is tired from not getting enough sleep. Planning to start new job next month so worried about lack of energy and sleep . Has taken xanax on and off for anxiety and it helps . No problem taking any med's      Other associated symptoms:See phq-9 and darinel 7    Complicating factors:     Significant life event: No                       Current substance abuse: None    PHQ-9 SCORE 7/26/2017 8/30/2017   Total Score 16 18     DARINEL-7 SCORE 7/26/2017 8/30/2017   Total Score 15 11       PHQ-9  English  PHQ-9   Any Language  GAD7        Amount of exercise or physical activity: None    Problems taking medications regularly: No    Medication side effects: none  Diet: regular (no restrictions)  Medication Followup of Alprazolam, Sertraline     Taking Medication as prescribed: yes    Side Effects:  Unsure if sertraline is increasing fatigue or not helping enough     Medication Helping Symptoms:  yes         PROBLEMS TO ADD ON...  Hypertension Follow-up      Outpatient blood pressures are being checked at home.  Results are 130/80.    Low Salt Diet: no added salt    Need refill on med's, no problem taking med's.           Problem list and histories reviewed & adjusted, as indicated.  Additional history: as documented    Patient Active Problem List   Diagnosis     Hypertensive urgency     Trichotillomania     HTN, goal below 140/90     Insomnia     Perimenopausal symptoms     Vitamin D deficiency     Fatigue     Abdominal cramps     TMJ (temporomandibular joint syndrome)     Cervicalgia     Tension type headache     Past  "Surgical History:   Procedure Laterality Date     CHOLECYSTECTOMY       COLONOSCOPY N/A 10/13/2015    Procedure: COMBINED COLONOSCOPY, SINGLE OR MULTIPLE BIOPSY/POLYPECTOMY BY BIOPSY;  Surgeon: Michael Mccoy MD;  Location:  GI     ESOPHAGOSCOPY, GASTROSCOPY, DUODENOSCOPY (EGD), COMBINED N/A 9/4/2015    Procedure: COMBINED ESOPHAGOSCOPY, GASTROSCOPY, DUODENOSCOPY (EGD), BIOPSY SINGLE OR MULTIPLE;  Surgeon: Hao Grove MD;  Location:  GI     GALLBLADDER SURGERY      2007?       Social History   Substance Use Topics     Smoking status: Never Smoker     Smokeless tobacco: Never Used     Alcohol use 0.0 oz/week     0 Standard drinks or equivalent per week      Comment: occasional      Family History   Problem Relation Age of Onset     DIABETES Mother      Hypertension Sister      CEREBROVASCULAR DISEASE Sister      TIA 35 years, heart defect.      Heart Failure Father      Cirrhosis Father              Reviewed and updated as needed this visit by clinical staffTobacco  Allergies  Meds       Reviewed and updated as needed this visit by Provider         ROS:  Constitutional, HEENT, cardiovascular, pulmonary, GI, , musculoskeletal, neuro, skin, endocrine and psych systems are negative, except as otherwise noted.      OBJECTIVE:   /83  Pulse 59  Temp 97.3  F (36.3  C) (Tympanic)  Ht 4' 10.5\" (1.486 m)  Wt 119 lb 12.8 oz (54.3 kg)  LMP 08/21/2017  SpO2 98%  BMI 24.61 kg/m2  Body mass index is 24.61 kg/(m^2).  GENERAL: healthy, alert and no distress  EYES: Eyes grossly normal to inspection, PERRL and conjunctivae and sclerae normal  NECK: no adenopathy, no asymmetry, masses, or scars and thyroid normal to palpation  NECK: no adenopathy  RESP: lungs clear to auscultation - no rales, rhonchi or wheezes  CV: regular rate and rhythm, normal S1 S2, no S3 or S4, no murmur, click or rub, no peripheral edema and peripheral pulses strong  ABDOMEN: soft, nontender, no hepatosplenomegaly, no masses and " bowel sounds normal  MS: no edema  NEURO: Normal strength and tone, mentation intact and speech normal  PSYCH: mentation appears normal, anxious, fatigued, speech pressured, judgement and insight intact and appearance well groomed        ASSESSMENT/PLAN:     (G47.00) Insomnia, unspecified type  Comment:   Plan: sertraline (ZOLOFT) 50 MG tablet, MENTAL HEALTH        REFERRAL            (F41.9,  F32.9) Anxiety and depression  Comment:   Plan: sertraline (ZOLOFT) 50 MG tablet, MENTAL HEALTH        REFERRAL          Discussed cares, talked about signs and symptoms of anxiety/ depression and treatment options. Willing to increase the dose of Zoloft 50 mg. Also willing to try trazadone again for sleep, may try even 1/2 tab for now to see how she feels.  Pros/ cons of med's discussed . encouraged to see  to help and referral  given. spent sometimes counseling patient. Follow up in 3-4, sooner if problem.       (I10) Essential hypertension with goal blood pressure less than 140/90  Comment: stable   Plan: lisinopril-hydrochlorothiazide         (PRINZIDE/ZESTORETIC) 20-25 MG per tablet                Patient expressed understanding and agreement with treatment plan. All patient's questions were answered, will let me know if has more later.  Medications: Rx's: Reviewed the potential side effects/complications of medications prescribed.       Maria Victoria Lau MD  [facility]

## 2017-08-30 NOTE — NURSING NOTE
"Chief Complaint   Patient presents with     Recheck Medication       Initial /83  Pulse 59  Temp 97.3  F (36.3  C) (Tympanic)  Ht 4' 10.5\" (1.486 m)  Wt 119 lb 12.8 oz (54.3 kg)  LMP 08/21/2017  SpO2 98%  BMI 24.61 kg/m2 Estimated body mass index is 24.61 kg/(m^2) as calculated from the following:    Height as of this encounter: 4' 10.5\" (1.486 m).    Weight as of this encounter: 119 lb 12.8 oz (54.3 kg).  Medication Reconciliation: complete  "

## 2017-08-31 ASSESSMENT — ANXIETY QUESTIONNAIRES: GAD7 TOTAL SCORE: 11

## 2017-09-01 DIAGNOSIS — K21.9 GASTROESOPHAGEAL REFLUX DISEASE WITHOUT ESOPHAGITIS: ICD-10-CM

## 2017-09-01 RX ORDER — OMEPRAZOLE 40 MG/1
CAPSULE, DELAYED RELEASE ORAL
Qty: 90 CAPSULE | Refills: 1 | Status: SHIPPED | OUTPATIENT
Start: 2017-09-01

## 2017-09-01 NOTE — TELEPHONE ENCOUNTER
omeprazole (PRILOSEC) 40 MG capsule    Last Written Prescription Date: 6-  Last Fill Quantity: 90,  # refills: 0   Last Office Visit with [facility], [facility] or [facility] prescribing provider: 8-

## 2017-09-01 NOTE — TELEPHONE ENCOUNTER
Prescription approved per [facility], [facility] or [facility] refill protocol.  Kiki Rae RN - Triage  [facility]

## 2017-09-05 DIAGNOSIS — E87.6 HYPOKALEMIA: ICD-10-CM

## 2017-09-05 NOTE — TELEPHONE ENCOUNTER
potassium chloride SA (POTASSIUM CHLORIDE) 20 MEQ      Last Written Prescription Date: 7/19/17  Last Fill Quantity: 60, # refills: 0  Last Office Visit with [facility], [facility] or [facility] prescribing provider: 8/30/17       Potassium   Date Value Ref Range Status   07/26/2017 3.6 3.4 - 5.3 mmol/L Final     Creatinine   Date Value Ref Range Status   07/26/2017 0.78 0.52 - 1.04 mg/dL Final     BP Readings from Last 3 Encounters:   08/30/17 122/83   07/26/17 136/68   01/20/17 110/64

## 2017-09-06 RX ORDER — POTASSIUM CHLORIDE 1500 MG/1
20 TABLET, EXTENDED RELEASE ORAL DAILY
Qty: 90 TABLET | Refills: 3 | Status: SHIPPED | OUTPATIENT
Start: 2017-09-06

## 2017-09-06 NOTE — TELEPHONE ENCOUNTER
Prescription approved per [facility], [facility] or [facility] refill protocol.  Kiki Rae RN - Triage  [facility]

## 2017-10-02 PROBLEM — F41.9 ANXIETY: Status: ACTIVE | Noted: 2017-10-02

## 2017-10-02 PROBLEM — F32.9 MAJOR DEPRESSIVE DISORDER WITH CURRENT ACTIVE EPISODE: Status: ACTIVE | Noted: 2017-10-02

## 2017-11-08 ENCOUNTER — MYC MEDICAL ADVICE (OUTPATIENT)
Dept: FAMILY MEDICINE | Facility: CLINIC | Age: 47
End: 2017-11-08

## 2017-11-08 DIAGNOSIS — F32.A ANXIETY AND DEPRESSION: ICD-10-CM

## 2017-11-08 DIAGNOSIS — G47.00 INSOMNIA, UNSPECIFIED TYPE: ICD-10-CM

## 2017-11-08 DIAGNOSIS — F41.9 ANXIETY AND DEPRESSION: ICD-10-CM

## 2017-11-08 ASSESSMENT — PATIENT HEALTH QUESTIONNAIRE - PHQ9
10. IF YOU CHECKED OFF ANY PROBLEMS, HOW DIFFICULT HAVE THESE PROBLEMS MADE IT FOR YOU TO DO YOUR WORK, TAKE CARE OF THINGS AT HOME, OR GET ALONG WITH OTHER PEOPLE: SOMEWHAT DIFFICULT
SUM OF ALL RESPONSES TO PHQ QUESTIONS 1-9: 11
SUM OF ALL RESPONSES TO PHQ QUESTIONS 1-9: 11

## 2017-11-08 NOTE — LETTER
[facility]  [address] [address]  [phone]        December 5, 2017  Michelle Toussaint  [address] [place] [id number]    Dear Michelle,    I care about your health and have reviewed your health plan. I have reviewed your medical conditions, medication list, and lab results and am making recommendations based on this review, to better manage your health.    You are in particular need of attention regarding:  -Depression    I am recommending that you:  Schedule a medication check  Here is a list of Health Maintenance topics that are due now or due soon:  Health Maintenance Due   Topic Date Due     Cholesterol Lab - yearly  07/06/1973     DEPRESSION SCREENING  07/06/1990     Depression Action Plan Review - yearly  07/06/1990     Wellness Visit with your Primary Provider - yearly  05/21/2016     Flu Vaccine - yearly  09/01/2017       Please call us at [phone] (or use [employer]) to address the above recommendations.     Thank you for trusting [facility] and we appreciate the opportunity to serve you.  We look forward to supporting your healthcare needs in the future.    Healthy RegardsReena Riaz, M.D.

## 2017-11-09 ASSESSMENT — PATIENT HEALTH QUESTIONNAIRE - PHQ9: SUM OF ALL RESPONSES TO PHQ QUESTIONS 1-9: 11

## 2017-11-13 NOTE — TELEPHONE ENCOUNTER
Routing to team to inform and assist in scheduling.   Agueda Quispe RN   [facility] - Triage

## 2017-11-13 NOTE — TELEPHONE ENCOUNTER
PHQ-9 SCORE 7/26/2017 8/30/2017 11/8/2017   Total Score [place] - - 11 (Moderate depression)   Total Score 16 18 11

## 2017-11-13 NOTE — TELEPHONE ENCOUNTER
Break in medication noted.   Please have the patient come in for OV for management.     Reena Correa MD  [facility] [place], [place]

## 2017-11-16 DIAGNOSIS — G47.00 INSOMNIA, UNSPECIFIED TYPE: ICD-10-CM

## 2017-11-16 DIAGNOSIS — F32.A ANXIETY AND DEPRESSION: ICD-10-CM

## 2017-11-16 DIAGNOSIS — F41.9 ANXIETY AND DEPRESSION: ICD-10-CM

## 2017-11-16 NOTE — TELEPHONE ENCOUNTER
PHQ-9 SCORE 7/26/2017 8/30/2017 11/8/2017   Total Score MyChart - - 11 (Moderate depression)   Total Score 16 18 11       See 11/8 my chart with updated PHQ-9 and discussion with patient  Kiki Rae RN - Triage  [facility]

## 2017-11-17 NOTE — TELEPHONE ENCOUNTER
Please have the patient follow up . 30 day RF ordered.    Reena Correa MD  [facility]

## 2017-11-22 NOTE — TELEPHONE ENCOUNTER
Yes, taking medication as prescribed. I have noticed I have been getting terrible headaches for about the last 1.5mths; but I think this is because my neck is very sore, tension headaches. I will call to schedule an appointment.     The patient has not yet scheduled.   Sertraline was reordered for 30 days on 11/16/17.  Rashmi Chamberlain RN

## 2017-11-29 NOTE — TELEPHONE ENCOUNTER
mychart message not reviewed     left voicemail message for patient to contact Main Clinic Number to schedule.  NTBS: Medication Check  Gregoria ZAPATA

## 2017-11-30 ENCOUNTER — OFFICE VISIT (OUTPATIENT)
Dept: FAMILY MEDICINE | Facility: CLINIC | Age: 47
End: 2017-11-30
Payer: COMMERCIAL

## 2017-11-30 VITALS
DIASTOLIC BLOOD PRESSURE: 85 MMHG | WEIGHT: 117 LBS | TEMPERATURE: 99 F | HEIGHT: 59 IN | HEART RATE: 63 BPM | BODY MASS INDEX: 23.59 KG/M2 | SYSTOLIC BLOOD PRESSURE: 139 MMHG | OXYGEN SATURATION: 100 %

## 2017-11-30 DIAGNOSIS — M26.609 TMJ (TEMPOROMANDIBULAR JOINT SYNDROME): Primary | ICD-10-CM

## 2017-11-30 DIAGNOSIS — G47.00 INSOMNIA, UNSPECIFIED TYPE: ICD-10-CM

## 2017-11-30 DIAGNOSIS — M54.2 CERVICALGIA: ICD-10-CM

## 2017-11-30 DIAGNOSIS — F41.9 ANXIETY AND DEPRESSION: ICD-10-CM

## 2017-11-30 DIAGNOSIS — F32.A ANXIETY AND DEPRESSION: ICD-10-CM

## 2017-11-30 DIAGNOSIS — I10 ESSENTIAL HYPERTENSION: ICD-10-CM

## 2017-11-30 PROCEDURE — 99214 OFFICE O/P EST MOD 30 MIN: CPT | Performed by: FAMILY MEDICINE

## 2017-11-30 RX ORDER — NABUMETONE 500 MG/1
500-1000 TABLET, FILM COATED ORAL 2 TIMES DAILY PRN
Qty: 30 TABLET | Refills: 0 | Status: SHIPPED | OUTPATIENT
Start: 2017-11-30

## 2017-11-30 ASSESSMENT — ANXIETY QUESTIONNAIRES
7. FEELING AFRAID AS IF SOMETHING AWFUL MIGHT HAPPEN: SEVERAL DAYS
1. FEELING NERVOUS, ANXIOUS, OR ON EDGE: NOT AT ALL
5. BEING SO RESTLESS THAT IT IS HARD TO SIT STILL: SEVERAL DAYS
6. BECOMING EASILY ANNOYED OR IRRITABLE: MORE THAN HALF THE DAYS
GAD7 TOTAL SCORE: 8
3. WORRYING TOO MUCH ABOUT DIFFERENT THINGS: SEVERAL DAYS
2. NOT BEING ABLE TO STOP OR CONTROL WORRYING: SEVERAL DAYS

## 2017-11-30 ASSESSMENT — PATIENT HEALTH QUESTIONNAIRE - PHQ9
SUM OF ALL RESPONSES TO PHQ QUESTIONS 1-9: 9
5. POOR APPETITE OR OVEREATING: MORE THAN HALF THE DAYS

## 2017-11-30 NOTE — PROGRESS NOTES
SUBJECTIVE:   Michelle Toussaint is a 47 year old female who presents to clinic today for the following health issues:              Medication Followup of Sertraline     Taking Medication as prescribed: yes    Side Effects:  None    Medication Helping Symptoms:  yes       Depression and Anxiety Follow-Up    Status since last visit: she thinks she is feeling somewhat better and mood and anxiety is much better , although if she forgets to take pill she can tell the difference .  also has noticed some improvement with mod although still clifford . Sleep is still not the best and she wakes up a lot, feels tired next day and occasionally naps. She was going to start a job although she did not take it and she does not feel like she wanted to take taht job anyway , so she is home and is     Other associated symptoms:None    Complicating factors:     Significant life event: No     Current substance abuse: None    PHQ-9 Score and MyChart F/U Questions 7/26/2017 8/30/2017 11/8/2017   Total Score 16 18 11   Q9: Suicide Ideation Not at all Not at all Not at all     ADITI-7 SCORE 7/26/2017 8/30/2017   Total Score 15 11       PHQ-9  English  PHQ-9   Any Language  GAD7  Suicide Assessment Five-step Evaluation and Treatment (SAFE-T)      Amount of exercise or physical activity: 2-3 days/week for an average of 30-45 minutes    Problems taking medications regularly: No    Medication side effects: none  Diet: regular (no restrictions)            PROBLEMS TO ADD ON...  Has HA recently last few weeks , she thinsk her neck is stiff lately and that could be triggering her HA. She had similar HA previously . Not taking any med's except occasional OTC med's not doing her neck exercise as much lately . Was in PT couple of months ago and felt better after that . Has TMJ and could be tightening her jaw more     Hypertension Follow-up      Outpatient blood pressures are being checked at home.  Results are ok . She has stopped taking her  lisinopril bc she was debating to have another Child, although sh understands she could be high risk but she as talked to her ob. So she is just started  on labetalol currently which was given last year by her GYN.  and feeling ok     Low Salt Diet: no added salt          Problem list and histories reviewed & adjusted, as indicated.  Additional history: as documented    Patient Active Problem List   Diagnosis     Hypertensive urgency     Trichotillomania     HTN, goal below 140/90     Insomnia     Perimenopausal symptoms     Vitamin D deficiency     Fatigue     Abdominal cramps     TMJ (temporomandibular joint syndrome)     Cervicalgia     Tension type headache     Anxiety     Major depressive disorder with current active episode     Past Surgical History:   Procedure Laterality Date     CHOLECYSTECTOMY       COLONOSCOPY N/A 10/13/2015    Procedure: COMBINED COLONOSCOPY, SINGLE OR MULTIPLE BIOPSY/POLYPECTOMY BY BIOPSY;  Surgeon: Michael Mccoy MD;  Location:  GI     ESOPHAGOSCOPY, GASTROSCOPY, DUODENOSCOPY (EGD), COMBINED N/A 9/4/2015    Procedure: COMBINED ESOPHAGOSCOPY, GASTROSCOPY, DUODENOSCOPY (EGD), BIOPSY SINGLE OR MULTIPLE;  Surgeon: Hao Grove MD;  Location:  GI     GALLBLADDER SURGERY      2007?       Social History   Substance Use Topics     Smoking status: Never Smoker     Smokeless tobacco: Never Used     Alcohol use 0.0 oz/week     0 Standard drinks or equivalent per week      Comment: occasional      Family History   Problem Relation Age of Onset     DIABETES Mother      Hypertension Sister      CEREBROVASCULAR DISEASE Sister      TIA 35 years, heart defect.      Heart Failure Father      Cirrhosis Father              Reviewed and updated as needed this visit by clinical staff     Reviewed and updated as needed this visit by Provider         ROS:  Constitutional, HEENT, cardiovascular, pulmonary, GI, , musculoskeletal, neuro, skin, endocrine and psych systems are negative, except as  "otherwise noted.      OBJECTIVE:   /85  Pulse 63  Temp 99  F (37.2  C) (Tympanic)  Ht 4' 10.5\" (1.486 m)  Wt 117 lb (53.1 kg)  LMP 11/15/2017  SpO2 100%  BMI 24.04 kg/m2  Body mass index is 24.04 kg/(m^2).  GENERAL: healthy, alert and no distress  NECK: no adenopathy, no asymmetry, masses, or scars and thyroid normal to palpation  RESP: lungs clear to auscultation - no rales, rhonchi or wheezes  CV: regular rate and rhythm, normal S1 S2,   ABDOMEN: soft, nontender, no hepatosplenomegaly, no masses and bowel sounds normal  MS: no edema  Neck with decrease ROM and tenderness to palpation in paracervical and trapezius are more so on left then rt , also tender on her TMJ   PSYCH: mentation appears normal, affect normal/bright, anxious, fatigued, speech pressured, judgement and insight intact and appearance well groomed        ASSESSMENT/PLAN:     (G47.00) Insomnia, unspecified type  Comment:   Plan: sertraline (ZOLOFT) 50 MG tablet            (F41.8) Anxiety and depression  Comment:   Plan: sertraline (ZOLOFT) 50 MG tablet          Discussed cares, talked about signs and symptoms of anxiety/ depression and treatment options. She is better but still need improvement with mod and sleep.Willing to increase the dose of Zoloft to 75  mg.    Pros/ cons of med's discussed . spent sometimes counseling patient. Follow up in 4-6 weeks, sooner if problem.       (BM.2) Cervicalgia  Comment:   Plan: nabumetone (RELAFEN) 500 MG tablet           (M26.609) TMJ (temporomandibular joint syndrome)  (primary encounter diagnosis)  Comment: likely stress related   Plan: nabumetone (RELAFEN) 500 MG tablet     discussed neck/ tmj  cares and symptomatic treatment including  adequate pain control, heat,  stretches etc. Script faxed.     she will do follow up if no improvement or problem. Consider further evaluation and  physical therapy if needed.         (I10) Essential hypertension  Comment: not taking lisinopril. switched per OB " recently , debating pregnancy, although she understands she could be high risk .   Plan: LABETALOL HCL PO    BP in adequate control. Discussed cares, low fat low salt diet etc.  encouraged home BP monitoring. Follow up if problem.       Patient expressed understanding and agreement with treatment plan. All patient's questions were answered, will let me know if has more later.  Medications: Rx's: Reviewed the potential side effects/complications of medications prescribed.       Maria Victoria Lau MD  [facility]

## 2017-11-30 NOTE — NURSING NOTE
"Chief Complaint   Patient presents with     Recheck Medication       Initial /85  Pulse 63  Temp 99  F (37.2  C) (Tympanic)  Ht 4' 10.5\" (1.486 m)  Wt 117 lb (53.1 kg)  LMP 11/15/2017  SpO2 100%  BMI 24.04 kg/m2 Estimated body mass index is 24.04 kg/(m^2) as calculated from the following:    Height as of this encounter: 4' 10.5\" (1.486 m).    Weight as of this encounter: 117 lb (53.1 kg).  Medication Reconciliation: complete  "

## 2017-11-30 NOTE — MR AVS SNAPSHOT
After Visit Summary   11/30/2017    Michelle Toussaint    MRN: [id number]           Patient Information     Date Of Birth          1970        Visit Information        Provider Department      11/30/2017 2:00 PM Maria Victoria Lau MD [facility]        Today's Diagnoses     TMJ (temporomandibular joint syndrome)    -  1    Insomnia, unspecified type        Anxiety and depression        Cervicalgia        Essential hypertension          Care Instructions      Treating Insomnia  Good sleeping habits are a key part of treatment. If needed, some medications may help you sleep better at first. Making healthy lifestyle changes and learning to relax can improve your sleep. Treating insomnia takes commitment, but trust that your efforts will pay off. Talk to your health care provider before taking any medication.    Healthy Lifestyle  Your lifestyle affects your health and your sleep. Here are some healthy habits:    Keep a regular sleep schedule. Go to bed and get up at the same time each day.    Exercise regularly. It may help you reduce stress. Avoid strenuous exercise for two to four hours before bedtime.    Avoid or limit naps.    Use your bed only for sleep and sex.    Don t spend too much time in bed trying to fall asleep. If you can t fall asleep, get up and do something until you become tired and drowsy.    Avoid or limit caffeine and nicotine. They can keep you awake at night. Also avoid alcohol. It may help you fall asleep at first, but your sleep will not be restful.  Before Bedtime  To sleep better every night, try these tips:    Have a bedtime routine to let your body and mind know when it s time to sleep.    Going to bed should be relaxing so try to do only relaxing things around bedtime. Sleep will come sooner.    If your worries don t let you sleep, write them down in a diary. Then close it, and go to bed.    Make sure the room is not too hot or too cold. If it s  not dark enough, an eye mask can help. If it s noisy, try using earplugs.  Learn to Relax  Stress, anxiety, and body tension may keep you awake at night. To unwind before bedtime, try reading a book, meditation, or yoga. Also, try the following:    Deep breathing. Sit or lie back in a chair. Take a slow, deep breath. Hold it for 5 counts. Then breathe out slowly through your mouth. Keep doing this until you feel relaxed.    Imagery. Think of the last fun trip you took. In your mind, walk through the trip from start to finish. Put as much detail into the memory as you can remember. It will help you relax.  Cognitive Behavioral Therapy (CBT)  CBT is the most effective treatment for long-term insomnia. It tries to address the underlying causes of your sleep problems, including your habits and how you think about sleep.   Individual Therapy  Reese Menon, PhD  Insomnia   [facility]: [phone]    [facility]: [phone]  Group Therapy  Dates and times to be announced.  Online Programs    www.[facility] (pronounced shut eye). There is a fee for this program. Enter the code  [facility]  if you decide to enroll in this program.     www.sleepIO.com (pronounced sleep ee oh). There is a fee for this program. Enter the code  [facility]  if you decide to enroll in this program.  Suggested Resources  Insomnia Treatment Books:    Overcoming Insomnia by Adi Jeffrey and Taty Nuñez (2008)    No More Sleepless Nights by Chance Florez and Jocelyn Vanessa (1996)    Say Stephen to Insomnia by Ace Hobbs (2009)    The Insomnia Workbook by Camila Lyn and Lawrence Cook (2009)    The Insomnia Answer by Hao Thomason and Jass Hearn (2006)?  Stress Management and Relaxation Books:    The Relaxation and Stress Reduction Workbook by Heather El, Charity Mesa and Gasper Menendez (2008)    Stress Management Workbook: Techniques and  Self-Assessment Procedures by Thelma Nogueira and Ish Lorenzo (1997)    A Mindfulness-Based Stress Reduction Workbook by Amol Vigil and Yelitza Bean (2010)    The Complete Stress Management Workbook by Josias Padron, Derrell Christenesn and Ricardo Yeager (1996)    Assert Yourself by Erin Gamez and Eloy Gamez (1977)  Relaxation Resources for Computer Download   These websites offer resources to help you relax. This list is for information only. [facility] is not responsible for the quality of services or the actions of any person or organization  Progressive Muscle Relaxation (PMR):    http://www.[employer]/progressive-muscle-relaxation-exercise.html    http://studentsupport.[facility]/counseling/resources/self-help/relaxation-and-stress-management/  Deep Breathing Exercises:    http://www.[employer]/breathing-awareness.html  Meditation:       wwwBeijing [employer]    www.PopSealguidedPriceMatchmeditation-site.com You may have to pay for some of these resources.  Guided Imagery:    http://www.[employer]/guided-imagery-scripts.html    http://[employer]/library/ywsvvzetrd-nqxiao-xgkhodx/  Counseling / Behavioral Health  [facility]  Visit [email or web address] or call [phone] to find a clinic close to you.   This is not a prescription and these resources are optional. You must pay for any costs when using these resources. Please ask your insurance carrier if you can be reimbursed for these resources. If so, you are responsible for sending the needed details to your insurance carrier. These resources may also be tax deductible as medical expenses. Check with your .  These programs and publications are not affiliated in any way with [facility].    2069-7340 The for; to (do) Centers. [address] [place] [address], [place] [address]. All rights reserved. This information is not intended as a substitute for professional medical care.  "Always follow your healthcare professional's instructions.  This information has been modified by your health care provider with permission from the publisher.                Follow-ups after your visit        Who to contact     If you have questions or need follow up information about today's clinic visit or your schedule please contact [facility] [place] directly at [phone].  Normal or non-critical lab and imaging results will be communicated to you by MyChart, letter or phone within 4 business days after the clinic has received the results. If you do not hear from us within 7 days, please contact the clinic through XunLighthart or phone. If you have a critical or abnormal lab result, we will notify you by phone as soon as possible.  Submit refill requests through [facility] or call your pharmacy and they will forward the refill request to us. Please allow 3 business days for your refill to be completed.          Additional Information About Your Visit        MyChart Information     [facility] gives you secure access to your electronic health record. If you see a primary care provider, you can also send messages to your care team and make appointments. If you have questions, please call your primary care clinic.  If you do not have a primary care provider, please call [phone] and they will assist you.        Care EveryWhere ID     This is your Care EveryWhere ID. This could be used by other organizations to access your [facility] medical records  [id number]        Your Vitals Were     Pulse Temperature Height Last Period Pulse Oximetry BMI (Body Mass Index)    63 99  F (37.2  C) (Tympanic) 4' 10.5\" (1.486 m) 11/15/2017 100% 24.04 kg/m2       Blood Pressure from Last 3 Encounters:   11/30/17 139/85   08/30/17 122/83   07/26/17 136/68    Weight from Last 3 Encounters:   11/30/17 117 lb (53.1 kg)   08/30/17 119 lb 12.8 oz (54.3 kg)   07/26/17 122 lb (55.3 kg)              Today, you had the following     No " orders found for display         Today's Medication Changes          These changes are accurate as of: 11/30/17  2:51 PM.  If you have any questions, ask your nurse or doctor.               Start taking these medicines.        Dose/Directions    nabumetone 500 MG tablet   Commonly known as:  RELAFEN   Used for:  TMJ (temporomandibular joint syndrome), Cervicalgia   Started by:  Maria Victoria Lau MD        Dose:  500-1000 mg   Take 1-2 tablets (500-1,000 mg) by mouth 2 times daily as needed for moderate pain   Quantity:  30 tablet   Refills:  0         These medicines have changed or have updated prescriptions.        Dose/Directions    sertraline 50 MG tablet   Commonly known as:  ZOLOFT   This may have changed:    - how much to take  - Another medication with the same name was removed. Continue taking this medication, and follow the directions you see here.   Used for:  Insomnia, unspecified type, Anxiety and depression   Changed by:  Maria Victoria Lau MD        Dose:  75 mg   Take 1.5 tablets (75 mg) by mouth daily   Quantity:  45 tablet   Refills:  1            Where to get your medicines      These medications were sent to [facility] - [place], [place] - [address] AT [facility] [address]  [address], [facility] [address]     Phone:  [phone]     nabumetone 500 MG tablet    sertraline 50 MG tablet                Primary Care Provider Office Phone # Fax #    Reena Correa [phone]       [address]  [place] [address]        Equal Access to Services     [facility] AH: Hadii aad ku hadasho Soomaali, waaxda luqadaha, qaybta kaalmada adeegyada, vazquez walls. So [facility] [phone].    ATENCIÓN: Si habla español, tiene a hines disposición servicios gratuitos de asistencia lingüística. Llame al [phone].    We comply with applicable federal civil rights laws and [place] laws. We do not discriminate on the basis of race,  color, national origin, age, disability, sex, sexual orientation, or gender identity.            Thank you!     Thank you for choosing [facility] [place]  for your care. Our goal is always to provide you with excellent care. Hearing back from our patients is one way we can continue to improve our services. Please take a few minutes to complete the written survey that you may receive in the mail after your visit with us. Thank you!             Your Updated Medication List - Protect others around you: Learn how to safely use, store and throw away your medicines at www.disposemymeds.org.          This list is accurate as of: 11/30/17  2:51 PM.  Always use your most recent med list.                   Brand Name Dispense Instructions for use Diagnosis    ALPRAZolam 0.25 MG tablet    XANAX    10 tablet    TAKE 1 TABLET BY MOUTH THREE TIMES DAILY AS NEEDED FOR ANXIETY    Anxiety and depression       Biotin 5 MG Tabs           COENZYME Q-10 PO           cyclobenzaprine 10 MG tablet    FLEXERIL    30 tablet    Take 0.5-1 tablets (5-10 mg) by mouth 3 times daily as needed for muscle spasms    Back muscle spasm       FISH OIL + D3 PO           FOLIC ACID PO      Take 1 mg by mouth daily        LABETALOL HCL PO      Take 50 mg by mouth once        LICORICE ROOT PO           meclizine 25 MG tablet    ANTIVERT    30 tablet    Take 1 tablet (25 mg) by mouth 3 times daily as needed for dizziness    BPPV (benign paroxysmal positional vertigo), unspecified laterality       milk thistle extract 140 MG Caps capsule           nabumetone 500 MG tablet    RELAFEN    30 tablet    Take 1-2 tablets (500-1,000 mg) by mouth 2 times daily as needed for moderate pain    TMJ (temporomandibular joint syndrome), Cervicalgia       omeprazole 40 MG capsule    priLOSEC    90 capsule    TAKE 1 CAPSULE BY MOUTH EVERY DAY    Gastroesophageal reflux disease without esophagitis       ondansetron 4 MG tablet    ZOFRAN    18 tablet    Take 1  tablet (4 mg) by mouth every 8 hours as needed for nausea    Nausea       potassium chloride SA 20 MEQ CR tablet    KLOR-CON    90 tablet    Take 1 tablet (20 mEq) by mouth daily    Hypokalemia       sertraline 50 MG tablet    ZOLOFT    45 tablet    Take 1.5 tablets (75 mg) by mouth daily    Insomnia, unspecified type, Anxiety and depression       traZODone 50 MG tablet    DESYREL    30 tablet    TAKE 1 TABLET BY MOUTH EVERY NIGHT AT BEDTIME    Insomnia, unspecified type       VITAMIN C PO           VITAMIN E COMPLEX PO      Take 400 Units by mouth

## 2017-11-30 NOTE — PATIENT INSTRUCTIONS
Treating Insomnia  Good sleeping habits are a key part of treatment. If needed, some medications may help you sleep better at first. Making healthy lifestyle changes and learning to relax can improve your sleep. Treating insomnia takes commitment, but trust that your efforts will pay off. Talk to your health care provider before taking any medication.    Healthy Lifestyle  Your lifestyle affects your health and your sleep. Here are some healthy habits:    Keep a regular sleep schedule. Go to bed and get up at the same time each day.    Exercise regularly. It may help you reduce stress. Avoid strenuous exercise for two to four hours before bedtime.    Avoid or limit naps.    Use your bed only for sleep and sex.    Don t spend too much time in bed trying to fall asleep. If you can t fall asleep, get up and do something until you become tired and drowsy.    Avoid or limit caffeine and nicotine. They can keep you awake at night. Also avoid alcohol. It may help you fall asleep at first, but your sleep will not be restful.  Before Bedtime  To sleep better every night, try these tips:    Have a bedtime routine to let your body and mind know when it s time to sleep.    Going to bed should be relaxing so try to do only relaxing things around bedtime. Sleep will come sooner.    If your worries don t let you sleep, write them down in a diary. Then close it, and go to bed.    Make sure the room is not too hot or too cold. If it s not dark enough, an eye mask can help. If it s noisy, try using earplugs.  Learn to Relax  Stress, anxiety, and body tension may keep you awake at night. To unwind before bedtime, try reading a book, meditation, or yoga. Also, try the following:    Deep breathing. Sit or lie back in a chair. Take a slow, deep breath. Hold it for 5 counts. Then breathe out slowly through your mouth. Keep doing this until you feel relaxed.    Imagery. Think of the last fun trip you took. In your mind, walk through the  trip from start to finish. Put as much detail into the memory as you can remember. It will help you relax.  Cognitive Behavioral Therapy (CBT)  CBT is the most effective treatment for long-term insomnia. It tries to address the underlying causes of your sleep problems, including your habits and how you think about sleep.   Individual Therapy  Reese Menon, PhD  Insomnia   [facility]: [phone]    [facility]: [phone]  Group Therapy  Dates and times to be announced.  Online Programs    www.Lalalama (pronounced shut eye). There is a fee for this program. Enter the code  GliAffidabili.it  if you decide to enroll in this program.     www.sleepIO.com (pronounced sleep ee oh). There is a fee for this program. Enter the code  GliAffidabili.it  if you decide to enroll in this program.  Suggested Resources  Insomnia Treatment Books:    Overcoming Insomnia by Adi Jeffrey and Taty Nuñez (2008)    No More Sleepless Nights by Chance Florez and Jocelyn Vanessa (1996)    Say Stephen to Insomnia by Ace Hobbs (2009)    The Insomnia Workbook by Camila Lyn and Lawrence Cook (2009)    The Insomnia Answer by Hao Thomason and Jass Hearn (2006)?  Stress Management and Relaxation Books:    The Relaxation and Stress Reduction Workbook by Heather El, Charity Mesa and Gasper Menendez (2008)    Stress Management Workbook: Techniques and Self-Assessment Procedures by Thelma Nogueira and Ish Lorenzo (1997)    A Mindfulness-Based Stress Reduction Workbook by Amol Vigil and Yelitza Bean (2010)    The Complete Stress Management Workbook by Josias Padron, Derrell Christensen and Ricardo Yeager (1996)    Assert Yourself by Erin Gamez and Eloy Gamez (1977)  Relaxation Resources for Computer Download   These websites offer resources to help you relax. This list is for information only. [facility] is not responsible for the quality  of services or the actions of any person or organization  Progressive Muscle Relaxation (PMR):    http://www.Servoy/progressive-muscle-relaxation-exercise.html    http://studentsupport.[facility]/counseling/resources/self-help/relaxation-and-stress-management/  Deep Breathing Exercises:    http://www.Servoy/breathing-awareness.html  Meditation:       wwwPenteoSurround    www.ezeepguidedStackEnginemeditationStackEnginesite.com You may have to pay for some of these resources.  Guided Imagery:    http://www.Servoy/guided-imagery-scripts.html    http://Gesplan/library/arvfrygdjk-ixcdns-jmqigjv/  Counseling / Behavioral Health  [facility]  Visit www.[employer].org or call [phone] to find a clinic close to you.   This is not a prescription and these resources are optional. You must pay for any costs when using these resources. Please ask your insurance carrier if you can be reimbursed for these resources. If so, you are responsible for sending the needed details to your insurance carrier. These resources may also be tax deductible as medical expenses. Check with your .  These programs and publications are not affiliated in any way with [employer].    2493-4699 [employer]. [address] [place] [address]. All rights reserved. This information is not intended as a substitute for professional medical care. Always follow your healthcare professional's instructions.  This information has been modified by your health care provider with permission from the publisher.

## 2017-12-01 ASSESSMENT — ANXIETY QUESTIONNAIRES: GAD7 TOTAL SCORE: 8

## 2017-12-05 NOTE — TELEPHONE ENCOUNTER
Per chart review, pt had an appt with Dr Lau on 11/30/17 and she refilled the medication. Triage please remove the med. Thank you.  Marla Toribio,

## 2017-12-11 DIAGNOSIS — I10 ESSENTIAL HYPERTENSION: ICD-10-CM

## 2017-12-11 NOTE — TELEPHONE ENCOUNTER
Spoke with patient, advised she has refills on Zoloft - she will contact pharmacy. She also states PCP was going to take over rxing Lobetalol but she needed to know the dose. She states its 100mg TID. Please review and advise.   Agueda Quispe RN   [facility] - Triage

## 2017-12-11 NOTE — TELEPHONE ENCOUNTER
Left non detailed message for patient to return call.  Agueda Quispe RN   [facility] - Triage

## 2017-12-11 NOTE — TELEPHONE ENCOUNTER
Reason for Call:  Medication or medication refill:    Do you use a [facility]?  Name of the pharmacy and phone number for the current request:   [facility] [address] [place] [address] [address] AT [facility] [place] [facility] [address]    Name of the medication requested: Labetalol 100 mg 1 tablet 3 times daily. Patient is asking about her sertraline. Says nurse told her they will be increasing it to 75 mg. She takes 50 now.    Can we leave a detailed message on this number? YES    Phone number patient can be reached at: Cell number on file:    Telephone Information:   Mobile [phone]     Best Time: Anytime    Call taken on 12/11/2017 at 12:22 PM by Malini Vargas

## 2017-12-12 NOTE — TELEPHONE ENCOUNTER
Patient has seen Dr. Lau recently for the last 3 visits. I am routing the encounter to Dr. Lau.  I have not seen the patient in a while now.    Reena Correa MD  [facility], [place]

## 2017-12-13 RX ORDER — LABETALOL 100 MG/1
TABLET, FILM COATED ORAL
Qty: 90 TABLET | Refills: 1 | Status: SHIPPED | OUTPATIENT
Start: 2017-12-13

## 2017-12-22 ENCOUNTER — TELEPHONE (OUTPATIENT)
Dept: FAMILY MEDICINE | Facility: CLINIC | Age: 47
End: 2017-12-22

## 2017-12-22 NOTE — TELEPHONE ENCOUNTER
Confirmed with the pharmacy that they do have rx on file to refill for the patient. Rashmi Chamberlain RN

## 2017-12-22 NOTE — TELEPHONE ENCOUNTER
Reason for Call:  Medication or medication refill:    Do you use a [facility]?  Name of the pharmacy and phone number for the current request:  [address] [id number]    Name of the medication requested: sertraline (ZOLOFT) 50 MG tablet    Other request: no    Can we leave a detailed message on this number? YES    Phone number patient can be reached at: Home number on file [phone] (home)    Best Time: anytime    Call taken on 12/22/2017 at 3:42 PM by Zee Farley

## 2017-12-28 NOTE — TELEPHONE ENCOUNTER
Called pharmacy [facility] for denial form faxed to start PA and was notified by their staff there was no rx order sent in from our clinic. Please advise.     Kimberley Dobson MA

## 2017-12-28 NOTE — TELEPHONE ENCOUNTER
Pt calling to say the pharmacy [facility] on [address] on [address] is saying sertraline needs PA for them to fill bc increased to 75 mg.     [phone] ok leave detailed message

## 2018-07-23 ENCOUNTER — HEALTH MAINTENANCE LETTER (OUTPATIENT)
Age: 48
End: 2018-07-23

## 2018-09-27 ENCOUNTER — DOCUMENTATION ONLY (OUTPATIENT)
Dept: CONSULT | Facility: CLINIC | Age: 48
End: 2018-09-27

## 2018-09-27 NOTE — TELEPHONE ENCOUNTER
"    Letter of Medical Necessity / Prior Authorization      TO: Payor: [employer]           Re: Michelle Mccann  : 1970  ID Number: [id number]  Group Number: [id number]      To Whom It May Concern:      This letter of medical necessity is being submitted for approval of coverage for chromosome analysis for member, Michelle Mccann.    Michelle s daughter, Mariangel, has mosaic Pina syndrome and associated bicuspid aortic valve and short stature.  Chromosome testing for Mariangel revealed one cell line with monosomy X (45,X) and another cell line containing one \"normal\" X chromosome and an isodicentric X chromosome.  Symptoms of Pina syndrome can include short stature, delayed puberty, infertility, heart defects, and certain learning disabilities.     Many cases of Pina syndrome occur sporadically (de magalie) in the individual, and some cases of variant / mosaic Pina syndrome are inherited from a parent who carries a rearrangement or other difference involving their X chromosome.      Parental chromosome testing was recommended for Michelle as a way to clarify the inheritance in the family (de magalie vs. inherited) and therefore clarify whether Michelle and other relatives may be at risk for having a variant form of Pina syndrome, or at risk for having an X chromosome rearrangement that could have additional reproductive and clinical consequences.  Michelle has short stature, which could be a potential sign of Pina syndrome.      Testing for Michelle will involve high resolution blood chromosome analysis to check for the presence of an X chromosome abnormality consistent with Pina syndrome, or any other chromosome rearrangement that could have given rise to the X chromosome differences in her daughter.  Testing will be performed by the [facility] lab.      This testing is considered to be medically necessary because the results from the testing could indicate a " possible diagnosis for Michelle which would alter her medical management, and the results from the testing would clarify the risk to Michelle s other children.      Ms. Mccann s motivation should be clear.  She wishes to obtain the most information possible about her risk to have Pina syndrome, and she would like to determine whether her other children may be at risk.  Relevant CPT and ICD 10 codes are listed below.    Thank you for your prompt consideration for coverage of this laboratory testing ordered for   Michelle Mccann.  Please feel free to contact me at [phone] should you have any questions or concerns.      Sincerely,            MD Janelle Santiago, MS, [facility]  Division of Genetics       Certified Genetic Counselor  Department of Pediatrics                                Division of Genetics and Metabolism       FAX: [phone]  ____________________________  CPT Code: 39543, 11032, 42089, 66192    ICD 10 Code: Z82.79

## 2018-12-20 ENCOUNTER — TELEPHONE (OUTPATIENT)
Dept: CONSULT | Facility: CLINIC | Age: 48
End: 2018-12-20

## 2018-12-20 NOTE — TELEPHONE ENCOUNTER
I called 12/20/18 to update Michelle on insurance coverage for hergenetic testing (approval was received). However, I was unable to reach Michelle.  I left a non-detailed voicemail with my name and phone number.  Brenda Aggarwal    Division of Genetics  [facility]  P: [phone]

## 2019-01-17 NOTE — TELEPHONE ENCOUNTER
I called 1/17/19 to update Michelle on insurance coverage for hergenetic testing (approval was received). However, I was unable to reach Michelle.  I left a non-detailed voicemail with my name and phone number.    Brenda Aggarwal    Division of Genetics  [facility]  P: [phone]

## 2019-02-05 NOTE — TELEPHONE ENCOUNTER
I called 2/5/19 to update Michelle on insurance coverage for her genetic testing (approval was received). However, I was unable to reach Michelle.  I left a detailed voicemail with my name and phone number.  Brenda Aggarwal    Division of Genetics  [facility]  P: [phone]

## 2020-07-09 ENCOUNTER — TELEPHONE (OUTPATIENT)
Dept: CONSULT | Facility: CLINIC | Age: 50
End: 2020-07-09

## 2020-07-09 NOTE — TELEPHONE ENCOUNTER
Called patient as we do not have active insurance on file. Left VM.    Arina Moran  Department   Department of Genetics  P:[phone]

## 2020-07-22 ENCOUNTER — TELEPHONE (OUTPATIENT)
Dept: CONSULT | Facility: CLINIC | Age: 50
End: 2020-07-22

## 2020-07-22 NOTE — TELEPHONE ENCOUNTER
Called patient as we do not have active insurance on file. Left VM.     Arina Moran  Department   Department of Genetics  P:[phone]

## 2020-07-27 ENCOUNTER — TELEPHONE (OUTPATIENT)
Dept: CONSULT | Facility: CLINIC | Age: 50
End: 2020-07-27

## 2020-07-27 NOTE — TELEPHONE ENCOUNTER
Called Michelle to get updated insurance coverage. She was unavailable and I left a non-detailed VM.    Arina Moran  Department   Department of Genetics  P:[phone]

## 2020-08-12 ENCOUNTER — TELEPHONE (OUTPATIENT)
Dept: CONSULT | Facility: CLINIC | Age: 50
End: 2020-08-12

## 2020-08-12 NOTE — TELEPHONE ENCOUNTER
Called patient to get insurance coverage after receiving a VM. She stated she was busy and I let her know she can leave a vm when she is free with detailed information on insurance coverage since I am working remote.     Arina Moran  Department   Department of Genetics  P:[phone]

## 2020-08-25 ENCOUNTER — TELEPHONE (OUTPATIENT)
Dept: CONSULT | Facility: CLINIC | Age: 50
End: 2020-08-25

## 2020-08-25 NOTE — TELEPHONE ENCOUNTER
Called patient to go over insurance coverage. She was unavailable and I left a non-detailed VM.     Arina Moran  Department   Department of Genetics  P:[phone]

## 2020-09-01 ENCOUNTER — TELEPHONE (OUTPATIENT)
Dept: CONSULT | Facility: CLINIC | Age: 50
End: 2020-09-01

## 2020-09-01 NOTE — TELEPHONE ENCOUNTER
Called patient to go over insurance coverage. She was unavailable and I left a non-detailed VM.      Arina Moran  Department   Department of Genetics  P:[phone]

## 2020-09-08 ENCOUNTER — TELEPHONE (OUTPATIENT)
Dept: CONSULT | Facility: CLINIC | Age: 50
End: 2020-09-08

## 2020-09-08 NOTE — TELEPHONE ENCOUNTER
Called patient to go over insurance coverage. She was unavailable and I left a non-detailed VM.      Arina Moran  Department   Department of Genetics  P:[phone]

## 2021-05-25 ENCOUNTER — RECORDS - HEALTHEAST (OUTPATIENT)
Dept: ADMINISTRATIVE | Facility: CLINIC | Age: 51
End: 2021-05-25

## 2021-05-29 ENCOUNTER — RECORDS - HEALTHEAST (OUTPATIENT)
Dept: ADMINISTRATIVE | Facility: CLINIC | Age: 51
End: 2021-05-29